# Patient Record
Sex: MALE | Race: BLACK OR AFRICAN AMERICAN | NOT HISPANIC OR LATINO | Employment: UNEMPLOYED | URBAN - METROPOLITAN AREA
[De-identification: names, ages, dates, MRNs, and addresses within clinical notes are randomized per-mention and may not be internally consistent; named-entity substitution may affect disease eponyms.]

---

## 2023-04-03 DIAGNOSIS — E78.9 LIPID DISORDER: Primary | ICD-10-CM

## 2023-04-03 RX ORDER — ATORVASTATIN CALCIUM 80 MG/1
80 TABLET, FILM COATED ORAL DAILY
Qty: 90 TABLET | Refills: 1 | Status: SHIPPED | OUTPATIENT
Start: 2023-04-03 | End: 2023-10-02 | Stop reason: SDUPTHER

## 2023-04-03 RX ORDER — ATORVASTATIN CALCIUM 80 MG/1
1 TABLET, FILM COATED ORAL DAILY
COMMUNITY
Start: 2020-03-25 | End: 2023-04-03 | Stop reason: SDUPTHER

## 2023-04-10 ENCOUNTER — HOSPITAL ENCOUNTER (OUTPATIENT)
Dept: DATA CONVERSION | Facility: HOSPITAL | Age: 68
End: 2023-04-10
Attending: INTERNAL MEDICINE | Admitting: INTERNAL MEDICINE
Payer: COMMERCIAL

## 2023-04-10 DIAGNOSIS — F17.210 NICOTINE DEPENDENCE, CIGARETTES, UNCOMPLICATED: ICD-10-CM

## 2023-04-10 DIAGNOSIS — I10 ESSENTIAL (PRIMARY) HYPERTENSION: ICD-10-CM

## 2023-04-10 DIAGNOSIS — K20.90 ESOPHAGITIS, UNSPECIFIED WITHOUT BLEEDING: ICD-10-CM

## 2023-04-10 DIAGNOSIS — K44.9 DIAPHRAGMATIC HERNIA WITHOUT OBSTRUCTION OR GANGRENE: ICD-10-CM

## 2023-04-10 DIAGNOSIS — R07.9 CHEST PAIN, UNSPECIFIED: ICD-10-CM

## 2023-04-10 DIAGNOSIS — R12 HEARTBURN: ICD-10-CM

## 2023-04-10 DIAGNOSIS — Z79.82 LONG TERM (CURRENT) USE OF ASPIRIN: ICD-10-CM

## 2023-04-10 DIAGNOSIS — K31.89 OTHER DISEASES OF STOMACH AND DUODENUM: ICD-10-CM

## 2023-04-19 LAB
COMPLETE PATHOLOGY REPORT: NORMAL
CONVERTED CLINICAL DIAGNOSIS-HISTORY: NORMAL
CONVERTED FINAL DIAGNOSIS: NORMAL
CONVERTED FINAL REPORT PDF LINK TO COPY AND PASTE: NORMAL
CONVERTED GROSS DESCRIPTION: NORMAL

## 2023-05-03 DIAGNOSIS — I10 HYPERTENSION, UNSPECIFIED TYPE: Primary | ICD-10-CM

## 2023-05-03 RX ORDER — AMLODIPINE BESYLATE 10 MG/1
10 TABLET ORAL DAILY
Qty: 90 TABLET | Refills: 1 | Status: SHIPPED | OUTPATIENT
Start: 2023-05-03 | End: 2023-07-25 | Stop reason: WASHOUT

## 2023-05-03 RX ORDER — AMLODIPINE BESYLATE 10 MG/1
1 TABLET ORAL DAILY
COMMUNITY
Start: 2019-04-23 | End: 2023-05-03 | Stop reason: SDUPTHER

## 2023-05-08 DIAGNOSIS — I25.118 CORONARY ARTERY DISEASE INVOLVING NATIVE HEART WITH OTHER FORM OF ANGINA PECTORIS, UNSPECIFIED VESSEL OR LESION TYPE (CMS-HCC): Primary | ICD-10-CM

## 2023-05-08 RX ORDER — CLOPIDOGREL BISULFATE 75 MG/1
75 TABLET ORAL DAILY
COMMUNITY
End: 2023-05-08 | Stop reason: SDUPTHER

## 2023-05-08 RX ORDER — CLOPIDOGREL BISULFATE 75 MG/1
75 TABLET ORAL DAILY
Qty: 90 TABLET | Refills: 1 | Status: SHIPPED | OUTPATIENT
Start: 2023-05-08 | End: 2023-12-15 | Stop reason: SDUPTHER

## 2023-06-15 ENCOUNTER — OFFICE VISIT (OUTPATIENT)
Dept: PRIMARY CARE | Facility: CLINIC | Age: 68
End: 2023-06-15
Payer: COMMERCIAL

## 2023-06-15 VITALS
DIASTOLIC BLOOD PRESSURE: 68 MMHG | BODY MASS INDEX: 20.7 KG/M2 | HEIGHT: 76 IN | WEIGHT: 170 LBS | TEMPERATURE: 97.6 F | SYSTOLIC BLOOD PRESSURE: 112 MMHG | HEART RATE: 57 BPM

## 2023-06-15 DIAGNOSIS — I25.10 CORONARY ARTERY DISEASE DUE TO LIPID RICH PLAQUE: ICD-10-CM

## 2023-06-15 DIAGNOSIS — N18.32 CHRONIC RENAL FAILURE (CRF), STAGE 3B (MULTI): ICD-10-CM

## 2023-06-15 DIAGNOSIS — M80.08XA AGE-RELATED OSTEOPOROSIS WITH CURRENT PATHOLOGICAL FRACTURE, VERTEBRA(E), INITIAL ENCOUNTER FOR FRACTURE (MULTI): ICD-10-CM

## 2023-06-15 DIAGNOSIS — R55 SYNCOPE AND COLLAPSE: ICD-10-CM

## 2023-06-15 DIAGNOSIS — I73.9 PAD (PERIPHERAL ARTERY DISEASE) (CMS-HCC): ICD-10-CM

## 2023-06-15 DIAGNOSIS — E55.9 VITAMIN D DEFICIENCY: ICD-10-CM

## 2023-06-15 DIAGNOSIS — M85.80 OSTEOPENIA, UNSPECIFIED LOCATION: ICD-10-CM

## 2023-06-15 DIAGNOSIS — Z00.00 HEALTHCARE MAINTENANCE: ICD-10-CM

## 2023-06-15 DIAGNOSIS — D64.9 ANEMIA, UNSPECIFIED TYPE: ICD-10-CM

## 2023-06-15 DIAGNOSIS — I25.83 CORONARY ARTERY DISEASE DUE TO LIPID RICH PLAQUE: ICD-10-CM

## 2023-06-15 DIAGNOSIS — R91.1 PULMONARY NODULE: ICD-10-CM

## 2023-06-15 DIAGNOSIS — K20.90 ESOPHAGITIS: Primary | ICD-10-CM

## 2023-06-15 LAB
ALANINE AMINOTRANSFERASE (SGPT) (U/L) IN SER/PLAS: 9 U/L (ref 10–52)
ALBUMIN (G/DL) IN SER/PLAS: 4.2 G/DL (ref 3.4–5)
ALKALINE PHOSPHATASE (U/L) IN SER/PLAS: 81 U/L (ref 33–136)
ANION GAP IN SER/PLAS: 13 MMOL/L (ref 10–20)
ASPARTATE AMINOTRANSFERASE (SGOT) (U/L) IN SER/PLAS: 17 U/L (ref 9–39)
BASOPHILS (10*3/UL) IN BLOOD BY AUTOMATED COUNT: 0.02 X10E9/L (ref 0–0.1)
BASOPHILS/100 LEUKOCYTES IN BLOOD BY AUTOMATED COUNT: 0.4 % (ref 0–2)
BILIRUBIN TOTAL (MG/DL) IN SER/PLAS: 0.7 MG/DL (ref 0–1.2)
CALCIDIOL (25 OH VITAMIN D3) (NG/ML) IN SER/PLAS: 38 NG/ML
CALCIUM (MG/DL) IN SER/PLAS: 10 MG/DL (ref 8.6–10.6)
CARBON DIOXIDE, TOTAL (MMOL/L) IN SER/PLAS: 24 MMOL/L (ref 21–32)
CHLORIDE (MMOL/L) IN SER/PLAS: 109 MMOL/L (ref 98–107)
CHOLESTEROL (MG/DL) IN SER/PLAS: 121 MG/DL (ref 0–199)
CHOLESTEROL IN HDL (MG/DL) IN SER/PLAS: 48.4 MG/DL
CHOLESTEROL/HDL RATIO: 2.5
CREATININE (MG/DL) IN SER/PLAS: 2.03 MG/DL (ref 0.5–1.3)
EOSINOPHILS (10*3/UL) IN BLOOD BY AUTOMATED COUNT: 0.21 X10E9/L (ref 0–0.7)
EOSINOPHILS/100 LEUKOCYTES IN BLOOD BY AUTOMATED COUNT: 4.6 % (ref 0–6)
ERYTHROCYTE DISTRIBUTION WIDTH (RATIO) BY AUTOMATED COUNT: 12.3 % (ref 11.5–14.5)
ERYTHROCYTE MEAN CORPUSCULAR HEMOGLOBIN CONCENTRATION (G/DL) BY AUTOMATED: 34.2 G/DL (ref 32–36)
ERYTHROCYTE MEAN CORPUSCULAR VOLUME (FL) BY AUTOMATED COUNT: 102 FL (ref 80–100)
ERYTHROCYTES (10*6/UL) IN BLOOD BY AUTOMATED COUNT: 4.13 X10E12/L (ref 4.5–5.9)
GFR MALE: 35 ML/MIN/1.73M2
GLUCOSE (MG/DL) IN SER/PLAS: 62 MG/DL (ref 74–99)
HEMATOCRIT (%) IN BLOOD BY AUTOMATED COUNT: 42.1 % (ref 41–52)
HEMOGLOBIN (G/DL) IN BLOOD: 14.4 G/DL (ref 13.5–17.5)
IMMATURE GRANULOCYTES/100 LEUKOCYTES IN BLOOD BY AUTOMATED COUNT: 0.2 % (ref 0–0.9)
LDL: 42 MG/DL (ref 0–99)
LEUKOCYTES (10*3/UL) IN BLOOD BY AUTOMATED COUNT: 4.6 X10E9/L (ref 4.4–11.3)
LYMPHOCYTES (10*3/UL) IN BLOOD BY AUTOMATED COUNT: 1.83 X10E9/L (ref 1.2–4.8)
LYMPHOCYTES/100 LEUKOCYTES IN BLOOD BY AUTOMATED COUNT: 40.2 % (ref 13–44)
MONOCYTES (10*3/UL) IN BLOOD BY AUTOMATED COUNT: 0.42 X10E9/L (ref 0.1–1)
MONOCYTES/100 LEUKOCYTES IN BLOOD BY AUTOMATED COUNT: 9.2 % (ref 2–10)
NEUTROPHILS (10*3/UL) IN BLOOD BY AUTOMATED COUNT: 2.06 X10E9/L (ref 1.2–7.7)
NEUTROPHILS/100 LEUKOCYTES IN BLOOD BY AUTOMATED COUNT: 45.4 % (ref 40–80)
NRBC (PER 100 WBCS) BY AUTOMATED COUNT: 0 /100 WBC (ref 0–0)
PLATELETS (10*3/UL) IN BLOOD AUTOMATED COUNT: 324 X10E9/L (ref 150–450)
POTASSIUM (MMOL/L) IN SER/PLAS: 5.2 MMOL/L (ref 3.5–5.3)
PROTEIN TOTAL: 6.6 G/DL (ref 6.4–8.2)
SODIUM (MMOL/L) IN SER/PLAS: 141 MMOL/L (ref 136–145)
THYROTROPIN (MIU/L) IN SER/PLAS BY DETECTION LIMIT <= 0.05 MIU/L: 1.38 MIU/L (ref 0.44–3.98)
TRIGLYCERIDE (MG/DL) IN SER/PLAS: 154 MG/DL (ref 0–149)
UREA NITROGEN (MG/DL) IN SER/PLAS: 22 MG/DL (ref 6–23)
VLDL: 31 MG/DL (ref 0–40)

## 2023-06-15 PROCEDURE — 80053 COMPREHEN METABOLIC PANEL: CPT

## 2023-06-15 PROCEDURE — 82306 VITAMIN D 25 HYDROXY: CPT

## 2023-06-15 PROCEDURE — G0438 PPPS, INITIAL VISIT: HCPCS | Performed by: FAMILY MEDICINE

## 2023-06-15 PROCEDURE — 1159F MED LIST DOCD IN RCRD: CPT | Performed by: FAMILY MEDICINE

## 2023-06-15 PROCEDURE — 84443 ASSAY THYROID STIM HORMONE: CPT

## 2023-06-15 PROCEDURE — 80061 LIPID PANEL: CPT

## 2023-06-15 PROCEDURE — 85025 COMPLETE CBC W/AUTO DIFF WBC: CPT

## 2023-06-15 RX ORDER — METOPROLOL SUCCINATE 25 MG/1
25 TABLET, EXTENDED RELEASE ORAL DAILY
COMMUNITY
End: 2023-12-15 | Stop reason: SDUPTHER

## 2023-06-15 RX ORDER — CHOLECALCIFEROL (VITAMIN D3) 25 MCG
1 TABLET ORAL DAILY
COMMUNITY

## 2023-06-15 RX ORDER — EPINEPHRINE 0.3 MG/.3ML
0.3 INJECTION SUBCUTANEOUS
COMMUNITY
Start: 2018-08-14 | End: 2024-06-05 | Stop reason: SDUPTHER

## 2023-06-15 RX ORDER — PNEUMOCOCCAL VACCINE POLYVALENT 25; 25; 25; 25; 25; 25; 25; 25; 25; 25; 25; 25; 25; 25; 25; 25; 25; 25; 25; 25; 25; 25; 25 UG/.5ML; UG/.5ML; UG/.5ML; UG/.5ML; UG/.5ML; UG/.5ML; UG/.5ML; UG/.5ML; UG/.5ML; UG/.5ML; UG/.5ML; UG/.5ML; UG/.5ML; UG/.5ML; UG/.5ML; UG/.5ML; UG/.5ML; UG/.5ML; UG/.5ML; UG/.5ML; UG/.5ML; UG/.5ML; UG/.5ML
0.5 INJECTION, SOLUTION INTRAMUSCULAR; SUBCUTANEOUS ONCE
Qty: 0.5 ML | Refills: 0 | Status: SHIPPED | OUTPATIENT
Start: 2023-06-15 | End: 2023-06-15

## 2023-06-15 RX ORDER — LOSARTAN POTASSIUM 100 MG/1
50 TABLET ORAL DAILY
COMMUNITY
End: 2023-07-25 | Stop reason: WASHOUT

## 2023-06-15 RX ORDER — ASPIRIN 81 MG/1
1 TABLET ORAL DAILY
COMMUNITY
Start: 2020-03-25

## 2023-06-15 RX ORDER — ESOMEPRAZOLE MAGNESIUM 40 MG/1
40 CAPSULE, DELAYED RELEASE ORAL
Qty: 30 CAPSULE | Refills: 11 | Status: SHIPPED | OUTPATIENT
Start: 2023-06-15 | End: 2023-07-25 | Stop reason: WASHOUT

## 2023-06-15 ASSESSMENT — ENCOUNTER SYMPTOMS
OCCASIONAL FEELINGS OF UNSTEADINESS: 0
DEPRESSION: 0
RESPIRATORY NEGATIVE: 1
LOSS OF SENSATION IN FEET: 0

## 2023-06-15 ASSESSMENT — PATIENT HEALTH QUESTIONNAIRE - PHQ9
SUM OF ALL RESPONSES TO PHQ9 QUESTIONS 1 AND 2: 0
1. LITTLE INTEREST OR PLEASURE IN DOING THINGS: NOT AT ALL
2. FEELING DOWN, DEPRESSED OR HOPELESS: NOT AT ALL

## 2023-06-15 NOTE — PROGRESS NOTES
Subjective   Reason for Visit: Kamron Grimm is an 67 y.o. male here for a Medicare Wellness visit.               HPI    Patient Care Team:  Linda Harry MD as PCP - General  Linda Harry MD as PCP - [employer] PCP     Review of Systems    Objective   Vitals:  There were no vitals taken for this visit.      Physical Exam    Assessment/Plan   Problem List Items Addressed This Visit    None          No

## 2023-06-15 NOTE — PROGRESS NOTES
"Subjective   Patient ID: Kamron Grimm is a 67 y.o. male who presents for [facility] .    HPI     Review of Systems   Respiratory: Negative.         Objective   /68   Pulse 57   Temp 36.4 °C (97.6 °F)   Ht 1.93 m (6' 4\")   Wt 77.1 kg (170 lb)   BMI 20.69 kg/m²     Physical Exam  HENT:      Head: Normocephalic.   Cardiovascular:      Rate and Rhythm: Normal rate.   Pulmonary:      Effort: Pulmonary effort is normal.   Abdominal:      General: Abdomen is flat.   Musculoskeletal:         General: Normal range of motion.   Skin:     General: Skin is warm.   Neurological:      General: No focal deficit present.      Mental Status: He is alert.   Psychiatric:         Mood and Affect: Mood normal.         Assessment/Plan     I saw and evaluated the patient. I personally obtained the key and critical portions of the history and physical exam. I reviewed the student 's documentation and discussed the patient with the student. I agree with the student medical decision making as documented in the student's note    This is a 67-year-old male patient who is here for his annual Medicare well exam and follow-up    I reviewed his chart and I was concerned about his prostate biopsy from 2019 advised him to see the urologist regarding that    Also he has nocturia and he might have to be on medication either to reduce the prostate volume or dilate the urethra but I would leave it up to the urologist to decide on that    Continue with all his other medication as it is    Regarding the chest pain it might be due to esophagitis that was diagnosed on the EGD advised him to give up smoking but apparently he is not ready to do that and also I informed him that the prieto that he drinks also irritates his esophagus so that might also be a issue for him to think about reducing the alcohol intake    Referred him to get the shingles vaccine and pneumonia vaccine    Also repeat the CAT scan for this year and I also ordered a bone " density to rule out osteopenia or osteoporosis

## 2023-06-15 NOTE — PROGRESS NOTES
"Pt is a 68 yo male with Hx of CAD, NSTEMI, HTN, hyperlipidemia, and benign prostatic hyperplasia. reporting for his annual Medicare wellvisit. Pt is reporting chest pains.    Pt has been having chest pains (6/10 pain) for less than a year. Has never had these before. Feels like \"a shot of indigestion.\" Takes TUMS and it \"quiets it down.\" Pain is not just after eating, will come up occasionally. Pain is described as dull and right in center of chest. Chest pain is not caused by exertion. Dyspnea on occasions such as walking up 30 stairs. Denies palpitations, dizziness, headaches, syncope. Denies chest pain while lying down or interruption of sleep. 2 pillows at night. Has had week of sneezing and coughing 1-2 weeks ago but better now.     Sleeps pretty good at night (6-8 hours). Wakes up 3-4 times at night to go to restroom. Urinating starts and stops.     Diet is mainly seafood, broccoli and green beans.     Pt's BP monitor is broken. He is also wondering if there is expiration on epi pen.    Tympanic membranes are intact. Minor sebaceous keratosis in right ear. Lungs clear to auscultation. Minor bradycardia (57), regular rhythm, normal S1 S2. No carotid bruits noted. Abdomen soft and non-tender to palpation. Normal bowel sounds.     Pt's symptoms are compatible with GERD, as his last endoscopy in April 2023 revealed LA Grade A esophagitis and a 3-cm hiatal hernia. His CAD, hyperlipidemia, and PMH of NSTEMI are concerning for angina, but pt's symptoms appear to be non-specific chest discomfort, and he has had regular visits to his cardiologist.     We will prescribe a PPI for his GERD. We advised pt to follow up with his urologist regarding his prostate. We will draw usual labs and bone density scan and order pneumococcal and shingrix vaccines. Follow-up in 6 months.  "

## 2023-06-15 NOTE — PATIENT INSTRUCTIONS
Kamron    I reviewed your chart --- it is important for you to see Dr.Donald Almazan regarding your prostate    I have referred you to smoking cessation class it is very important for you to learn to give up smoking    I will also place an order for you to take your pneumonia vaccine vaccine for shingles    I will also place an order to check your bones for thinning of the bones    I will check the blood vessels in your neck for narrowing I will order ultrasound of carotid arteries on your neck    You need to see the Eye Doc--- Dr. Lassiter --- last visit was in August    Will Start you on Nexium to reduce the acid causing chest pain--- It is aggravated by your CTN to smoke. Stopping smoking is the #1 thing you can do to prevent the chest pain from getting worse and allowing your food pipe to heal from the irritation caused by tobacco smoke going down your throat.    Maricel can also cause your chest pain because it irritates the food pipe from your throat to your stomach, and the pain can come up to 24 hours later, so try to hold off on it.    I hope you are eating healthy at least 2 green leafy vegetables daily and also try to get some exercise daily such as walking and muscle building exercises around --- shoulder/ core / hip/ knee

## 2023-07-25 ENCOUNTER — OFFICE VISIT (OUTPATIENT)
Dept: PRIMARY CARE | Facility: CLINIC | Age: 68
End: 2023-07-25
Payer: COMMERCIAL

## 2023-07-25 ENCOUNTER — PATIENT OUTREACH (OUTPATIENT)
Dept: PRIMARY CARE | Facility: CLINIC | Age: 68
End: 2023-07-25

## 2023-07-25 VITALS
BODY MASS INDEX: 20.46 KG/M2 | TEMPERATURE: 97.6 F | HEIGHT: 76 IN | DIASTOLIC BLOOD PRESSURE: 58 MMHG | HEART RATE: 60 BPM | WEIGHT: 168 LBS | SYSTOLIC BLOOD PRESSURE: 109 MMHG

## 2023-07-25 DIAGNOSIS — R55 SYNCOPE AND COLLAPSE: ICD-10-CM

## 2023-07-25 DIAGNOSIS — G47.30 SLEEP APNEA IN ADULT: Primary | ICD-10-CM

## 2023-07-25 DIAGNOSIS — I10 HYPERTENSION, UNSPECIFIED TYPE: ICD-10-CM

## 2023-07-25 DIAGNOSIS — I25.10 CORONARY ARTERY DISEASE DUE TO LIPID RICH PLAQUE: ICD-10-CM

## 2023-07-25 DIAGNOSIS — N18.32 CHRONIC RENAL FAILURE (CRF), STAGE 3B (MULTI): ICD-10-CM

## 2023-07-25 DIAGNOSIS — I25.83 CORONARY ARTERY DISEASE DUE TO LIPID RICH PLAQUE: ICD-10-CM

## 2023-07-25 PROCEDURE — 1159F MED LIST DOCD IN RCRD: CPT | Performed by: FAMILY MEDICINE

## 2023-07-25 PROCEDURE — 99215 OFFICE O/P EST HI 40 MIN: CPT | Performed by: FAMILY MEDICINE

## 2023-07-25 PROCEDURE — 1125F AMNT PAIN NOTED PAIN PRSNT: CPT | Performed by: FAMILY MEDICINE

## 2023-07-25 PROCEDURE — 3074F SYST BP LT 130 MM HG: CPT | Performed by: FAMILY MEDICINE

## 2023-07-25 PROCEDURE — 3078F DIAST BP <80 MM HG: CPT | Performed by: FAMILY MEDICINE

## 2023-07-25 RX ORDER — AMLODIPINE BESYLATE 5 MG/1
5 TABLET ORAL DAILY
Qty: 90 TABLET | Refills: 1 | Status: SHIPPED | OUTPATIENT
Start: 2023-07-25 | End: 2024-07-24

## 2023-07-25 RX ORDER — TAMSULOSIN HYDROCHLORIDE 0.4 MG/1
0.4 CAPSULE ORAL
COMMUNITY
End: 2024-03-04 | Stop reason: WASHOUT

## 2023-07-25 RX ORDER — ISOSORBIDE DINITRATE 30 MG/1
60 TABLET ORAL DAILY
COMMUNITY
End: 2023-12-04 | Stop reason: WASHOUT

## 2023-07-25 RX ORDER — SPIRONOLACTONE 25 MG/1
25 TABLET ORAL DAILY
COMMUNITY
End: 2024-03-04 | Stop reason: SDUPTHER

## 2023-07-25 RX ORDER — LOSARTAN POTASSIUM 50 MG/1
50 TABLET ORAL DAILY
Qty: 90 TABLET | Refills: 1 | Status: SHIPPED | OUTPATIENT
Start: 2023-07-25 | End: 2024-03-04 | Stop reason: WASHOUT

## 2023-07-25 RX ORDER — AMLODIPINE BESYLATE 5 MG/1
5 TABLET ORAL DAILY
Qty: 30 TABLET | Refills: 11 | Status: SHIPPED | OUTPATIENT
Start: 2023-07-25 | End: 2023-07-25 | Stop reason: SDUPTHER

## 2023-07-25 RX ORDER — LOSARTAN POTASSIUM 50 MG/1
50 TABLET ORAL DAILY
Qty: 30 TABLET | Refills: 11 | Status: SHIPPED | OUTPATIENT
Start: 2023-07-25 | End: 2023-07-25 | Stop reason: SDUPTHER

## 2023-07-25 NOTE — PROGRESS NOTES
This is a hospital follow-up    He has many medical problems while he was getting his haircut he lost consciousness and was rushed to the emergency room    Reviewing his labs I noticed that he had a high alcohol level and also his blood pressure is extremely low and his pulse is also low so my diagnosis is it could be that his low blood pressure and low pulse would have made him have a syncopal attack    Reviewing his sleep apnea test he has severe sleep apnea I am wondering whether that can be contributing for him to have a syncopal attack    He has not got his CPAP machine I referred him to the sleep specialist to talk about this further    I advised him to see the cardiologist since he had the syncopal attack and also his blood pressure is low    I reduce the losartan to 50 mg and amlodipine to 5 continue the metoprolol at 25    He also has gastritis on EGD and continuing to drink alcohol I advised him to take Tums and also advised him to stop drinking alcohol and also he is continuing to smoke      Advised him to come back in 1 month

## 2023-07-25 NOTE — PATIENT INSTRUCTIONS
Amlodipine is changed from 10 mg per day to 5 mg per day  2. Losartan is changed from 100 mg per day to 50 mg day

## 2023-07-25 NOTE — PROGRESS NOTES
Discharge Facility: [place] [facility] [address]  Discharge Diagnosis: syncope/hypotension  Admission Date: 7-  Discharge Date: 7-    PCP Appointment Date: 7-  Specialist Appointment Date: FERMIN  Hospital Encounter and Summary: Linked   See discharge assessment below for further details  Engagement  Call Start Time: 0920 (7/25/2023  9:29 AM)    Medications  Medications reviewed with patient/caregiver?: Yes (7/25/2023  9:29 AM)  Is the patient having any side effects they believe may be caused by any medication additions or changes?: No (7/25/2023  9:29 AM)  Does the patient have all medications ordered at discharge?: Yes (7/25/2023  9:29 AM)  Care Management Interventions: Provided patient education (7/25/2023  9:29 AM)  Prescription Comments: Will decrease his losartan to 50 mg daily, amlodipine 5 mg daily and metoprolol 12.5 mg daily.  Patient states his BP monitor has not been functioning.Discussed BP monitors Omron and where to obtain  He will monitor his bp and hr at home and call his pcp with changes Discussed importancee of daily monitoring at different times of the day and recording results for review at follow up appointments. (7/25/2023  9:29 AM)  Is the patient taking all medications as directed (includes completed medication regime)?: Yes (7/25/2023  9:29 AM)  Care Management Interventions: Notified pharmacy for assistance (7/25/2023  9:29 AM)  Medication Comments: Outreach to pharmacy to support BP monitoring.No issues reported in regards to accessibility affordability adherence  Denies any  questions or concerns about their medications once they are home. Verbalizes an understanding regarding how to take their medications and which medications they should be taking.  Denies the need for any medication refills at this time. (7/25/2023  9:29 AM)    Appointments  Does the patient have a primary care provider?: Yes (Confirmed PCP follow up today) (7/25/2023  9:29 AM)  Care Management Interventions: Verified  appointment date/time/provider (2023  9:29 AM)  Has the patient kept scheduled appointments due by today?: Yes (2023  9:29 AM)  Care Management Interventions: Advised patient to keep appointment (2023  9:29 AM)    Self Management  What is the home health agency?: NA (2023  9:29 AM)  Has home health visited the patient within 72 hours of discharge?: Not applicable (2023  9:29 AM)  What Durable Medical Equipment (DME) was ordered?: NA (2023  9:29 AM)  Has all Durable Medical Equipment (DME) been delivered?: No (2023  9:29 AM)    Patient Teaching  Does the patient have access to their discharge instructions?: No (2023  9:29 AM)  Care Management Interventions: Reviewed instructions with patient (2023  9:29 AM)  What is the patient's perception of their health status since discharge?: Improving (2023  9:29 AM)  Is the patient/caregiver able to teach back the hierarchy of who to call/visit for symptoms/problems? PCP, Specialist, Home Health nurse, Urgent Care, ED, 911: Yes (You need to call your doctor,  return to the closest ED if you develop any new  or worsening symptoms:  concerning symptoms  call 911 for emergency situations  Call your doctor for questions / concerrns.) (2023  9:29 AM)  Patient/Caregiver Education Comments: Patient denies any questions related to the discharge instructions.Emphasized the importance of hospital follow up. This is a great way for you to connect with your provider to ensure you have safely transitioned home.If you have any questions or concerns prior to appointment, please call the PCP's office right away. (2023  9:29 AM)    Wrap Up  Wrap Up Additional Comments: Spoke w/ pt  Pt identified by name and  Reviewed discharge instructions, medications, and follow up.  Patient denies any further discharge questions/needs at this time.  Confirms they will attend the scheduled follow up appointment scheduled for today. (2023   9:29 AM)  Call End Time: 0935 (7/25/2023  9:29 AM)

## 2023-07-25 NOTE — PROGRESS NOTES
Mr. Grimm a 68 byear old man with Pmh alcohol use, GERD, ANUM, heart failure, here for followup  following LOC and ED visit.     Echo with elevated RVP increase and sleep apnea    Prostate - Abnormal cells. Sees a urologist now for his prostate.    EGD -     ANUM and CPAP counseled. Willing to try it.    Counseled regarding seeing a cardiologist

## 2023-07-26 ENCOUNTER — PATIENT OUTREACH (OUTPATIENT)
Dept: PRIMARY CARE | Facility: CLINIC | Age: 68
End: 2023-07-26
Payer: COMMERCIAL

## 2023-07-26 NOTE — PROGRESS NOTES
Call regarding appt. with PCP on  7- 25 -2023 after hospitalization.  At time of outreach call the patient feels as if their condition has improved.  They  deny any questions or concerns regarding  the recovery period  or follow up appointment,  Agreeable to continued outreach. They have my direct phone # and were encouraged to please reach out should they need any assistance prior to my next outreach.

## 2023-07-28 ENCOUNTER — PATIENT OUTREACH (OUTPATIENT)
Dept: PHARMACY | Facility: HOSPITAL | Age: 68
End: 2023-07-28
Payer: COMMERCIAL

## 2023-07-28 RX ORDER — DIPHENHYDRAMINE HCL 25 MG
4 CAPSULE ORAL AS NEEDED
COMMUNITY

## 2023-07-28 NOTE — PROGRESS NOTES
"Transitional Care Management: Pharmacy    Subjective   Kamron Grimm is a 68 y.o. male who was referred to Clinical Pharmacy Team to complete TCM medication reconciliation prior to office visit with Linda Harry MD. A comprehensive medication review was completed with the patient. patient did not have all medications and/or an updated medication list in front of them during the telephone encounter.     Admission Date:7/22/23  Discharge Date:7/23/23    Notable medication changes following discharge:  START: N/A  STOP: N/A  CHANGE:   - Amlodipine decreased to 5 mg   - losartan decreased to 50 mg   - Metoprolol decreased to 12.5 mg   - Spironolactone decreased to 25 mg daily  - Tamsulosin decreased to 0.4 mg once at bedtime    MEDICATION RECONCILIATION  Added: Nicotine gum 4 mg   Changed: N/A   Removed: N/A    Allergies   Allergen Reactions    Bee Venom Protein (Honey Bee) Swelling    Lisinopril Cough and Unknown    Prednisolone Unknown       [facility] [address] [place] [address] & [address] [place] [id number]  Phone: [phone] Fax: [phone]       No issues reported in regards to accessibility adherence adverse effects organization.    HPI    Objective     LAB  There were no vitals taken for this visit.     Lab Results   Component Value Date    BILITOT 0.9 07/23/2023    CALCIUM 8.4 (L) 07/23/2023    CO2 20 (L) 07/23/2023     07/23/2023    CREATININE 2.15 (H) 07/23/2023    GLUCOSE 79 07/23/2023    ALKPHOS 59 07/23/2023    K 4.2 07/23/2023    PROT 5.9 (L) 07/23/2023     (L) 07/23/2023    AST 12 07/23/2023    ALT 6 (L) 07/23/2023    BUN 25 (H) 07/23/2023    ANIONGAP 11 07/23/2023    MG 1.80 07/22/2023    PHOS 3.5 01/11/2023    ALBUMIN 3.5 07/23/2023    GFRMALE 33 (A) 07/23/2023     Lab Results   Component Value Date    TRIG 154 (H) 06/15/2023    CHOL 121 06/15/2023    HDL 48.4 06/15/2023     No results found for: \"HGBA1C\"    DRUG " INTERACTIONS  -No major interactions    Assessment/Plan    Comments/Recommendations to PCP:  Patient was not able to obtain new blood pressure monitor due to cost since insurance will not pay for a new one yet.  Offered patient Omron monitor through  retail pharmacies for price of $34 but patient declined.      Continue all meds under the continuation of care with the referring provider and clinical pharmacy team.    Stefany Escalera, PharmD    Verbal consent to manage patient's drug therapy was obtained from the patient and/or an individual authorized to act on behalf of a patient. They were informed they may decline to participate or withdraw from participation in pharmacy services at any time.

## 2023-08-28 ENCOUNTER — OFFICE VISIT (OUTPATIENT)
Dept: PRIMARY CARE | Facility: CLINIC | Age: 68
End: 2023-08-28
Payer: COMMERCIAL

## 2023-08-28 VITALS
SYSTOLIC BLOOD PRESSURE: 121 MMHG | HEART RATE: 65 BPM | BODY MASS INDEX: 20.82 KG/M2 | TEMPERATURE: 97.2 F | HEIGHT: 76 IN | WEIGHT: 171 LBS | DIASTOLIC BLOOD PRESSURE: 72 MMHG

## 2023-08-28 DIAGNOSIS — I10 HYPERTENSION, UNSPECIFIED TYPE: Primary | ICD-10-CM

## 2023-08-28 DIAGNOSIS — I25.83 CORONARY ARTERY DISEASE DUE TO LIPID RICH PLAQUE: ICD-10-CM

## 2023-08-28 DIAGNOSIS — N18.32 CHRONIC RENAL FAILURE (CRF), STAGE 3B (MULTI): ICD-10-CM

## 2023-08-28 DIAGNOSIS — I73.9 PAD (PERIPHERAL ARTERY DISEASE) (CMS-HCC): ICD-10-CM

## 2023-08-28 DIAGNOSIS — I25.10 CORONARY ARTERY DISEASE DUE TO LIPID RICH PLAQUE: ICD-10-CM

## 2023-08-28 PROCEDURE — 99214 OFFICE O/P EST MOD 30 MIN: CPT | Performed by: FAMILY MEDICINE

## 2023-08-28 PROCEDURE — 1125F AMNT PAIN NOTED PAIN PRSNT: CPT | Performed by: FAMILY MEDICINE

## 2023-08-28 PROCEDURE — 1159F MED LIST DOCD IN RCRD: CPT | Performed by: FAMILY MEDICINE

## 2023-08-28 PROCEDURE — 3074F SYST BP LT 130 MM HG: CPT | Performed by: FAMILY MEDICINE

## 2023-08-28 PROCEDURE — 3078F DIAST BP <80 MM HG: CPT | Performed by: FAMILY MEDICINE

## 2023-08-28 NOTE — PROGRESS NOTES
This is a 68-year-old     Multiple medical problems    Very noncompliant    He missed his sleep specialist appointment but promised to get another appointment I hope he will do so; he totally understands the consequences of sleep apnea not being treated he has severe coronary artery disease with congestive heart failure and chronic renal failure      I read the notes of the urology and nephrologist     Reminded patient to call the urology office for the MRI prostate order  Until I told him about the MRI for the prostate he had no clue of that order    Also I read the nephrology notes the nephrologist wanted him to come back with a food journal to find out whether he is a good candidate to start him on Farxiga for renal protection      The reduce doses of amlodipine losartan and metoprolol seems to be holding the blood pressure good he has never had any syncopal episodes in since I saw him last    Again reminded patient to see the cardiologist    I also referred him to clinic pharmacy to get help with the blood pressure monitor    He was advised to bring all his medications to all his appointments    He was not sure about his isosorbide dose    I confirmed that he is taking amlodipine 5 mg losartan 50 and metoprolol 25    Advised him to come back in 3 months    Again I spoke to him about alcohol and smoking he says he will work at those

## 2023-08-28 NOTE — PATIENT INSTRUCTIONS
You saw the urology people on June 28 and they had ordered the MRI of your prostate which you never got it done please look into that.  You are asked to come in 6 weeks to go through the MRI results of the prostate.  Today you are telling me you never knew that there was a order for a prostate MRI therefore please call the urology office to find out about the order for the MRI of the prostate      The kidney doctor had advised about a medicine to protect your kidneys from further damage she wanted you to come back with a food journal to find out how much starch you are eating.  May be you need to make another follow-up appointment to see the kidney doctor sometime in the fall but before that you need to write down your food journal at least for 1 week so that she can see how much starch you are eating      Very important for you to see the sleep specialist and you missed your appointment that was set up for you in [place] but you are telling me that you can go and see the doctor in November which is closer to you but you still have not made arrangements so make sure you take the responsibility to make the arrangements to see the sleep specialist      As well as take the responsibility tales see the cardiologist      Again I remind you to think about smoking and intake of alcohol for your health    You need to be responsible you need to find out how you can improve your health and I am telling you over and over again reading other doctors notes and what you need to get done but apparently when questioning you have no clue    The good news is your blood pressure is good on the reduced medicine      Reviewing your medicines you were not sure whether you are taking isosorbide 30 mg or 60 mg make sure you bring all your medicines for all the appointments not only to see me but to see the other doctors also you need to take all your medicines in the original bottles    Amlodipine 5 mg losartan 50 and metoprolol  25--this your main blood pressure med      Flomax, spironolactone, isosorbide all of these 3 medicines also affect your blood pressure they reduce the blood pressure    Follow-up in 3 months

## 2023-09-07 VITALS — HEIGHT: 76 IN | WEIGHT: 175.27 LBS | BODY MASS INDEX: 21.34 KG/M2

## 2023-09-12 ENCOUNTER — PATIENT OUTREACH (OUTPATIENT)
Dept: PRIMARY CARE | Facility: CLINIC | Age: 68
End: 2023-09-12
Payer: COMMERCIAL

## 2023-09-12 LAB
ALBUMIN (G/DL) IN SER/PLAS: 4.3 G/DL (ref 3.4–5)
ALBUMIN (MG/L) IN URINE: <7 MG/L
ALBUMIN/CREATININE (UG/MG) IN URINE: NORMAL UG/MG CRT (ref 0–30)
ANION GAP IN SER/PLAS: 10 MMOL/L (ref 10–20)
APPEARANCE, URINE: CLEAR
BILIRUBIN, URINE: NEGATIVE
BLOOD, URINE: NEGATIVE
CALCIDIOL (25 OH VITAMIN D3) (NG/ML) IN SER/PLAS: 51 NG/ML
CALCIUM (MG/DL) IN SER/PLAS: 8.7 MG/DL (ref 8.6–10.3)
CARBON DIOXIDE, TOTAL (MMOL/L) IN SER/PLAS: 26 MMOL/L (ref 21–32)
CHLORIDE (MMOL/L) IN SER/PLAS: 106 MMOL/L (ref 98–107)
COLOR, URINE: YELLOW
CREATININE (MG/DL) IN SER/PLAS: 2 MG/DL (ref 0.5–1.3)
CREATININE (MG/DL) IN URINE: 118 MG/DL (ref 20–370)
CREATININE (MG/DL) IN URINE: 118 MG/DL (ref 20–370)
ERYTHROCYTE DISTRIBUTION WIDTH (RATIO) BY AUTOMATED COUNT: 13.5 % (ref 11.5–14.5)
ERYTHROCYTE MEAN CORPUSCULAR HEMOGLOBIN CONCENTRATION (G/DL) BY AUTOMATED: 34.6 G/DL (ref 32–36)
ERYTHROCYTE MEAN CORPUSCULAR VOLUME (FL) BY AUTOMATED COUNT: 100 FL (ref 80–100)
ERYTHROCYTES (10*6/UL) IN BLOOD BY AUTOMATED COUNT: 4 X10E12/L (ref 4.5–5.9)
GFR MALE: 36 ML/MIN/1.73M2
GLUCOSE (MG/DL) IN SER/PLAS: 75 MG/DL (ref 74–99)
GLUCOSE, URINE: NEGATIVE MG/DL
HEMATOCRIT (%) IN BLOOD BY AUTOMATED COUNT: 39.9 % (ref 41–52)
HEMOGLOBIN (G/DL) IN BLOOD: 13.8 G/DL (ref 13.5–17.5)
KETONES, URINE: NEGATIVE MG/DL
LEUKOCYTE ESTERASE, URINE: ABNORMAL
LEUKOCYTES (10*3/UL) IN BLOOD BY AUTOMATED COUNT: 5 X10E9/L (ref 4.4–11.3)
NITRITE, URINE: NEGATIVE
NRBC (PER 100 WBCS) BY AUTOMATED COUNT: 0.4 /100 WBC (ref 0–0)
PARATHYRIN INTACT (PG/ML) IN SER/PLAS: 51.4 PG/ML (ref 18.5–88)
PH, URINE: 6 (ref 5–8)
PHOSPHATE (MG/DL) IN SER/PLAS: 3.6 MG/DL (ref 2.5–4.9)
PLATELETS (10*3/UL) IN BLOOD AUTOMATED COUNT: 352 X10E9/L (ref 150–450)
POTASSIUM (MMOL/L) IN SER/PLAS: 4.3 MMOL/L (ref 3.5–5.3)
PROTEIN (MG/DL) IN URINE: 12 MG/DL (ref 5–25)
PROTEIN, URINE: NEGATIVE MG/DL
PROTEIN/CREATININE (MG/MG) IN URINE: 0.1 MG/MG CREAT (ref 0–0.17)
RBC, URINE: 2 /HPF (ref 0–5)
SODIUM (MMOL/L) IN SER/PLAS: 138 MMOL/L (ref 136–145)
SPECIFIC GRAVITY, URINE: 1.01 (ref 1–1.03)
SQUAMOUS EPITHELIAL CELLS, URINE: 1 /HPF
UREA NITROGEN (MG/DL) IN SER/PLAS: 16 MG/DL (ref 6–23)
UROBILINOGEN, URINE: <2 MG/DL (ref 0–1.9)
WBC, URINE: 17 /HPF (ref 0–5)

## 2023-09-12 NOTE — PROGRESS NOTES
Call placed regarding one month post discharge follow up call.  At time of outreach call the patient feels as if their condition has improved.  They  deny any questions or concerns regarding  the recovery period  or follow up appointment, He states he has been busy with follow up appointments.  He reports that he missed his cardiology follow up - I emphasized the importance of rescheduling.  He is agreeable

## 2023-09-27 LAB
APPEARANCE, URINE: CLEAR
BILIRUBIN, URINE: NEGATIVE
BLOOD, URINE: NEGATIVE
COLOR, URINE: YELLOW
GLUCOSE, URINE: NEGATIVE MG/DL
KETONES, URINE: NEGATIVE MG/DL
LEUKOCYTE ESTERASE, URINE: NEGATIVE
NITRITE, URINE: NEGATIVE
PH, URINE: 7 (ref 5–8)
PROSTATE SPECIFIC AG (NG/ML) IN SER/PLAS: 6.23 NG/ML (ref 0–4)
PROTEIN, URINE: NEGATIVE MG/DL
SPECIFIC GRAVITY, URINE: 1.01 (ref 1–1.03)
UROBILINOGEN, URINE: <2 MG/DL (ref 0–1.9)

## 2023-09-28 LAB — URINE CULTURE: NORMAL

## 2023-10-02 DIAGNOSIS — E78.9 LIPID DISORDER: ICD-10-CM

## 2023-10-02 RX ORDER — ATORVASTATIN CALCIUM 80 MG/1
80 TABLET, FILM COATED ORAL DAILY
Qty: 90 TABLET | Refills: 1 | Status: SHIPPED | OUTPATIENT
Start: 2023-10-02 | End: 2024-04-29 | Stop reason: SDUPTHER

## 2023-10-26 ENCOUNTER — PATIENT OUTREACH (OUTPATIENT)
Dept: PRIMARY CARE | Facility: CLINIC | Age: 68
End: 2023-10-26
Payer: COMMERCIAL

## 2023-10-31 ENCOUNTER — HOSPITAL ENCOUNTER (OUTPATIENT)
Dept: RADIOLOGY | Facility: HOSPITAL | Age: 68
Discharge: HOME | End: 2023-10-31
Payer: COMMERCIAL

## 2023-10-31 DIAGNOSIS — R97.20 ELEVATED PROSTATE SPECIFIC ANTIGEN (PSA): ICD-10-CM

## 2023-10-31 PROCEDURE — 2550000001 HC RX 255 CONTRASTS: Performed by: NURSE PRACTITIONER

## 2023-10-31 PROCEDURE — A9575 INJ GADOTERATE MEGLUMI 0.1ML: HCPCS | Performed by: NURSE PRACTITIONER

## 2023-10-31 PROCEDURE — 72197 MRI PELVIS W/O & W/DYE: CPT | Performed by: RADIOLOGY

## 2023-10-31 PROCEDURE — 72197 MRI PELVIS W/O & W/DYE: CPT

## 2023-10-31 RX ORDER — GADOTERATE MEGLUMINE 376.9 MG/ML
16 INJECTION INTRAVENOUS
Status: COMPLETED | OUTPATIENT
Start: 2023-10-31 | End: 2023-10-31

## 2023-10-31 RX ADMIN — GADOTERATE MEGLUMINE 16 ML: 376.9 INJECTION INTRAVENOUS at 17:20

## 2023-11-28 ENCOUNTER — APPOINTMENT (OUTPATIENT)
Dept: PRIMARY CARE | Facility: CLINIC | Age: 68
End: 2023-11-28
Payer: COMMERCIAL

## 2023-11-28 NOTE — PROGRESS NOTES
Subjective   Patient ID: Kamron Grimm is a 68 y.o. male presenting for FUV to review labs and imaging     HPI  History of Elevated PSA. Family history of prostate cancer (maternal uncle)  Negative biopsy 7/18/19, negative MRI 8/12/21. CAD (NSTEMI 3/24/20 treated with cardiac rehab) on Brilinta   HTN. Chronic renal insufficiency followed by Dr. Walter     Today's visit; Patient has been doing well still having NTF x3-4. Occasional daytime frequency. Patient notes occasional slow flow. Denies hematuria, dysuria, flank pain, and bothersome frequency or urgency.   Patient is a current smoker.      Lab Results   Component Value Date    PSA 6.23 (H) 09/27/2023    PSA 8.27 (H) 02/15/2023    PSA 8.39 (H) 06/24/2021 09/27/2023 Urine Culture Negative  09/12/2023 Urinalysis, microscopic: RBC 2/HPF    I personally reviewed Prostate MRI 10/31/2023  1. There is no evidence of clinically significant neoplasm.  2. BPH changes of the transition zone. Diffuse non nodular  hypointensities within the peripheral zone, without evidence of  focally restricted diffusion (PI-RADS 2).      PI-RADS 2 - Low (clinically significant cancer is unlikely to be  present).    Review of Systems  All other systems have been reviewed and are negative for complaint.    Objective   Physical Exam  General: Well developed, well nourished, alert and cooperative, appears in no acute distress  Eyes: Non-injected conjunctiva, sclera clear, no proptosis  Cardiac: Extremities are warm and well perfused. No edema, cyanosis or pallor.   Lungs: Breathing is easy, non-labored. Speaking in clear and complete sentences. Normal diaphragmatic movement.  MSK: Ambulatory with steady gait, unassisted  Neuro: alert and oriented to person, place and time  Psych: Demonstrates good judgement and reason, without hallucinations, abnormal affect or abnormal behaviors.  Skin: no obvious lesions, no rashes.    Assessment/Plan   Diagnoses and all orders for this  visit:  Elevated PSA  BPH with obstruction/lower urinary tract symptoms      (Elevated PSA)  Today we discussed PSA as a tool to screen gentleman for prostate cancer. We discussed that many factors can elevate the PSA, and when the PSA is elevated further testing is warranted.     Prostate MRI 10/31/2023 demonstrated at 80.2 g prostate, PI-RADS 2 lesion with no evidence of clinically significant malignancy       Continue Tamsulosin to 0.8 mg daily    Patient will follow up in 6 months, sooner if needed.      All questions and concerns were addressed. Patient verbalizes understanding and has no other questions at this time. You are able to have email access to your chart. You can sign into [employer] or add the Follow My Health slivana on your smart phone to review today's visit, laboratory work and imaging.      Vicky Joyner-- KAREEN MARIE  Office Phone: [phone]

## 2023-11-29 ENCOUNTER — OFFICE VISIT (OUTPATIENT)
Dept: UROLOGY | Facility: HOSPITAL | Age: 68
End: 2023-11-29
Payer: COMMERCIAL

## 2023-11-29 DIAGNOSIS — N13.8 BPH WITH OBSTRUCTION/LOWER URINARY TRACT SYMPTOMS: Primary | ICD-10-CM

## 2023-11-29 DIAGNOSIS — R97.20 ELEVATED PSA: ICD-10-CM

## 2023-11-29 DIAGNOSIS — N40.1 BPH WITH OBSTRUCTION/LOWER URINARY TRACT SYMPTOMS: Primary | ICD-10-CM

## 2023-11-29 LAB
POC APPEARANCE, URINE: CLEAR
POC BILIRUBIN, URINE: NEGATIVE
POC BLOOD, URINE: ABNORMAL
POC COLOR, URINE: YELLOW
POC GLUCOSE, URINE: NEGATIVE MG/DL
POC KETONES, URINE: NEGATIVE MG/DL
POC LEUKOCYTES, URINE: NEGATIVE
POC NITRITE,URINE: NEGATIVE
POC PH, URINE: 7 PH
POC PROTEIN, URINE: NEGATIVE MG/DL
POC SPECIFIC GRAVITY, URINE: 1.02
POC UROBILINOGEN, URINE: 0.2 EU/DL

## 2023-11-29 PROCEDURE — 1126F AMNT PAIN NOTED NONE PRSNT: CPT | Performed by: NURSE PRACTITIONER

## 2023-11-29 PROCEDURE — 99213 OFFICE O/P EST LOW 20 MIN: CPT | Performed by: NURSE PRACTITIONER

## 2023-11-29 PROCEDURE — 1159F MED LIST DOCD IN RCRD: CPT | Performed by: NURSE PRACTITIONER

## 2023-12-04 ENCOUNTER — OFFICE VISIT (OUTPATIENT)
Dept: PRIMARY CARE | Facility: CLINIC | Age: 68
End: 2023-12-04
Payer: COMMERCIAL

## 2023-12-04 VITALS
HEART RATE: 59 BPM | BODY MASS INDEX: 21.07 KG/M2 | SYSTOLIC BLOOD PRESSURE: 139 MMHG | WEIGHT: 173 LBS | DIASTOLIC BLOOD PRESSURE: 81 MMHG | HEIGHT: 76 IN | TEMPERATURE: 98 F

## 2023-12-04 DIAGNOSIS — I25.10 CORONARY ARTERY DISEASE DUE TO LIPID RICH PLAQUE: ICD-10-CM

## 2023-12-04 DIAGNOSIS — I10 HYPERTENSION, UNSPECIFIED TYPE: Primary | ICD-10-CM

## 2023-12-04 DIAGNOSIS — I25.83 CORONARY ARTERY DISEASE DUE TO LIPID RICH PLAQUE: ICD-10-CM

## 2023-12-04 DIAGNOSIS — R55 SYNCOPE AND COLLAPSE: ICD-10-CM

## 2023-12-04 PROCEDURE — 1126F AMNT PAIN NOTED NONE PRSNT: CPT | Performed by: FAMILY MEDICINE

## 2023-12-04 PROCEDURE — 99214 OFFICE O/P EST MOD 30 MIN: CPT | Performed by: FAMILY MEDICINE

## 2023-12-04 PROCEDURE — 3075F SYST BP GE 130 - 139MM HG: CPT | Performed by: FAMILY MEDICINE

## 2023-12-04 PROCEDURE — 3079F DIAST BP 80-89 MM HG: CPT | Performed by: FAMILY MEDICINE

## 2023-12-04 PROCEDURE — 1159F MED LIST DOCD IN RCRD: CPT | Performed by: FAMILY MEDICINE

## 2023-12-04 RX ORDER — ISOSORBIDE MONONITRATE 60 MG/1
60 TABLET, EXTENDED RELEASE ORAL DAILY
COMMUNITY
End: 2024-02-27 | Stop reason: SDUPTHER

## 2023-12-04 NOTE — PROGRESS NOTES
This is a 68-year-old male patient here for follow-up    He says he was at a grocery store while waiting online for checkout he became diaphoretic and presyncopal.    No chest pain no shortness of breath no palpitations    Feeling lasted for about 30 minutes since he recovered from it he never went to the emergency room    On exam vital signs are stable unremarkable lung and heart exam    I messaged his cardiologist as well as I advised him if he gets the symptoms again for him to rush to the emergency room and also call the cardiology office to see whether he can see the cardiologist sometime this week    Advised him to come back in 3 months

## 2023-12-04 NOTE — PATIENT INSTRUCTIONS
On Friday, November 30 you had an episode of fainting with profuse sweating it can be a sign symptom of a minor heart attack I am send a message to your cardiologist right on the chart but I also would like you to call  for follow-up appointment if possible try to get in for you to see him this week      I like you to come back in 3 months    If this episode happens again please check yourself to the emergency room

## 2023-12-11 ENCOUNTER — APPOINTMENT (OUTPATIENT)
Dept: PRIMARY CARE | Facility: CLINIC | Age: 68
End: 2023-12-11
Payer: COMMERCIAL

## 2023-12-12 ENCOUNTER — APPOINTMENT (OUTPATIENT)
Dept: CARDIOLOGY | Facility: HOSPITAL | Age: 68
End: 2023-12-12
Payer: COMMERCIAL

## 2023-12-14 ENCOUNTER — OFFICE VISIT (OUTPATIENT)
Dept: CARDIOLOGY | Facility: HOSPITAL | Age: 68
End: 2023-12-14
Payer: COMMERCIAL

## 2023-12-14 VITALS
DIASTOLIC BLOOD PRESSURE: 71 MMHG | WEIGHT: 177 LBS | BODY MASS INDEX: 21.55 KG/M2 | HEART RATE: 64 BPM | SYSTOLIC BLOOD PRESSURE: 131 MMHG | OXYGEN SATURATION: 97 % | HEIGHT: 76 IN

## 2023-12-14 DIAGNOSIS — R55 VASOVAGAL SYNCOPE: Primary | ICD-10-CM

## 2023-12-14 PROCEDURE — 1159F MED LIST DOCD IN RCRD: CPT | Performed by: INTERNAL MEDICINE

## 2023-12-14 PROCEDURE — 1126F AMNT PAIN NOTED NONE PRSNT: CPT | Performed by: INTERNAL MEDICINE

## 2023-12-14 PROCEDURE — 99215 OFFICE O/P EST HI 40 MIN: CPT | Performed by: INTERNAL MEDICINE

## 2023-12-14 NOTE — PATIENT INSTRUCTIONS
To reach Dr. Evangelista's office please call [phone] ([facility]). Fax [phone]. Call [phone] to schedule an appointment. You may also contact the HF RNs at [email or web address]    Thank you for coming to your appointment today. If you have any questions or need cardiac medication refills, please call the Heart Failure Office at [phone] option [phone].   You may also contact the HF RNs at [email or web address]     Please schedule an exercise stress echocardiogram at [phone]  Once you get your test then schedule a phone call with Dr. Evangelista at [phone] for the results

## 2023-12-14 NOTE — PROGRESS NOTES
"Advanced Heart Failure and Cardiac Transplantation Cardiology    Kamron Grimm is a 68 y.o. male from [place], [place]. Works as a . Lives with his sister. At our last visit in 9/2023 I advised him to increase losartan dose to 100mg every day.    Per Dr. Linda Harry MD note:    About 3 weeks ago: \"He says he was at a grocery store while waiting online for checkout he became diaphoretic 'sweating profusely' and presyncopal. No chest pain no shortness of breath no palpitations. Feeling lasted for about 30 minutes since he recovered from it he never went to the emergency room.\"    Exam: /71 (BP Location: Right arm, Patient Position: Sitting)   Pulse 64   Ht 1.93 m (6' 4\")   Wt 80.3 kg (177 lb)   SpO2 97%   BMI 21.55 kg/m²   No JVD  RRR, no murmur at rest or with valsalva  No LE edema    Current Outpatient Medications   Medication Instructions    amLODIPine (NORVASC) 5 mg, oral, Daily    aspirin 81 mg EC tablet 1 tablet, oral, Daily    atorvastatin (LIPITOR) 80 mg, oral, Daily    cholecalciferol (Vitamin D-3) 25 MCG (1000 UT) tablet 1 tablet, oral, Daily    clopidogrel (PLAVIX) 75 mg, oral, Daily    EPINEPHrine 0.3 mg/0.3 mL injection syringe 0.3 mL, intramuscular    isosorbide mononitrate ER (IMDUR) 60 mg, oral, Daily, Do not crush or chew.    losartan (COZAAR) 50 mg, oral, Daily    metoprolol succinate XL (TOPROL-XL) 25 mg, oral, Daily    nicotine polacrilex (NICORETTE) 4 mg, Mouth/Throat, As needed, Chew once piece slowly and intermittently for 30 minutes.  Repeat every 1-2 hours maximum of 24 pieces a day.    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 Because he does have known significant basal septal hypertrophy I think that further evaluation to r/o LVOT obstruction, and because he is a smoker further evaluation to r/o WMA with exercise, are all appropriate.    Plan:  -- Proceed with stress echocardiogram  -- Will plan on telephonic visit to discuss results and next steps    Altaf Evangelista MD, MPH  Advanced Heart Failure and Transplant Cardiology  [facility]  [facility]

## 2023-12-14 NOTE — Clinical Note
Linda, Thanks for sending this patient back. Please see my note, my plan is to further assess risk with a stress echocardiogram. Altaf

## 2023-12-15 DIAGNOSIS — I25.118 CORONARY ARTERY DISEASE INVOLVING NATIVE HEART WITH OTHER FORM OF ANGINA PECTORIS, UNSPECIFIED VESSEL OR LESION TYPE (CMS-HCC): ICD-10-CM

## 2023-12-15 RX ORDER — METOPROLOL SUCCINATE 25 MG/1
25 TABLET, EXTENDED RELEASE ORAL DAILY
Qty: 90 TABLET | Refills: 2 | Status: SHIPPED | OUTPATIENT
Start: 2023-12-15

## 2023-12-15 RX ORDER — CLOPIDOGREL BISULFATE 75 MG/1
75 TABLET ORAL DAILY
Qty: 90 TABLET | Refills: 1 | Status: SHIPPED | OUTPATIENT
Start: 2023-12-15

## 2023-12-27 ENCOUNTER — APPOINTMENT (OUTPATIENT)
Dept: UROLOGY | Facility: HOSPITAL | Age: 68
End: 2023-12-27
Payer: COMMERCIAL

## 2023-12-29 ENCOUNTER — HOSPITAL ENCOUNTER (OUTPATIENT)
Dept: CARDIOLOGY | Facility: HOSPITAL | Age: 68
Discharge: HOME | End: 2023-12-29
Payer: COMMERCIAL

## 2023-12-29 DIAGNOSIS — R55 VASOVAGAL SYNCOPE: Primary | ICD-10-CM

## 2023-12-29 DIAGNOSIS — R55 VASOVAGAL SYNCOPE: ICD-10-CM

## 2023-12-29 PROBLEM — E87.6 HYPOKALEMIA: Status: ACTIVE | Noted: 2023-12-29

## 2023-12-29 PROBLEM — D64.9 ANEMIA: Status: ACTIVE | Noted: 2023-12-29

## 2023-12-29 PROBLEM — N18.4 STAGE 4 CHRONIC KIDNEY DISEASE (MULTI): Status: ACTIVE | Noted: 2023-12-29

## 2023-12-29 PROBLEM — I71.40 ABDOMINAL AORTIC ANEURYSM, WITHOUT RUPTURE, UNSPECIFIED (CMS-HCC): Status: ACTIVE | Noted: 2023-01-05

## 2023-12-29 PROBLEM — H52.13 MYOPIA OF BOTH EYES WITH REGULAR ASTIGMATISM: Status: ACTIVE | Noted: 2023-12-29

## 2023-12-29 PROBLEM — I95.9 HYPOTENSION: Status: ACTIVE | Noted: 2023-07-23

## 2023-12-29 PROBLEM — I10 HYPERTENSION: Status: ACTIVE | Noted: 2023-12-29

## 2023-12-29 PROBLEM — H52.03 HYPEROPIA OF BOTH EYES: Status: ACTIVE | Noted: 2020-11-11

## 2023-12-29 PROBLEM — F10.10 ALCOHOL ABUSE, CONTINUOUS: Status: ACTIVE | Noted: 2023-12-29

## 2023-12-29 PROBLEM — I42.2 PRIMARY HYPERTROPHIC CARDIOMYOPATHY (MULTI): Status: ACTIVE | Noted: 2023-12-29

## 2023-12-29 PROBLEM — R60.0 EDEMA OF LOWER EXTREMITY: Status: ACTIVE | Noted: 2023-12-29

## 2023-12-29 PROBLEM — H25.13 CATARACT, NUCLEAR SCLEROTIC, BOTH EYES: Status: ACTIVE | Noted: 2023-12-29

## 2023-12-29 PROBLEM — L98.9 INFLAMMATORY DERMATOSIS: Status: ACTIVE | Noted: 2023-12-29

## 2023-12-29 PROBLEM — I71.40 AAA (ABDOMINAL AORTIC ANEURYSM) (CMS-HCC): Status: ACTIVE | Noted: 2023-12-29

## 2023-12-29 PROBLEM — F17.200 SMOKING: Status: ACTIVE | Noted: 2023-12-29

## 2023-12-29 PROBLEM — K20.90 ESOPHAGITIS, UNSPECIFIED WITHOUT BLEEDING: Status: ACTIVE | Noted: 2023-04-10

## 2023-12-29 PROBLEM — I73.9 INTERMITTENT CLAUDICATION (CMS-HCC): Status: ACTIVE | Noted: 2023-12-29

## 2023-12-29 PROBLEM — R97.20 BPH WITH ELEVATED PSA: Status: ACTIVE | Noted: 2023-12-29

## 2023-12-29 PROBLEM — H52.223 MYOPIA OF BOTH EYES WITH REGULAR ASTIGMATISM: Status: ACTIVE | Noted: 2023-12-29

## 2023-12-29 PROBLEM — I21.4 NSTEMI, INITIAL EPISODE OF CARE (MULTI): Status: ACTIVE | Noted: 2023-12-29

## 2023-12-29 PROBLEM — I10 BENIGN ESSENTIAL HYPERTENSION: Status: ACTIVE | Noted: 2023-12-29

## 2023-12-29 PROBLEM — I20.89 CHRONIC STABLE ANGINA (CMS-HCC): Status: ACTIVE | Noted: 2023-12-29

## 2023-12-29 PROBLEM — N18.32 STAGE 3B CHRONIC KIDNEY DISEASE (MULTI): Status: ACTIVE | Noted: 2023-12-29

## 2023-12-29 PROBLEM — N40.0 BPH WITH ELEVATED PSA: Status: ACTIVE | Noted: 2023-12-29

## 2023-12-29 PROBLEM — N28.0: Status: ACTIVE | Noted: 2023-12-29

## 2023-12-29 PROBLEM — R13.10 DYSPHAGIA: Status: ACTIVE | Noted: 2023-12-29

## 2023-12-29 PROBLEM — R31.9 HEMATURIA: Status: ACTIVE | Noted: 2023-12-29

## 2023-12-29 PROBLEM — T78.3XXA ANGIOEDEMA: Status: ACTIVE | Noted: 2023-12-29

## 2023-12-29 PROBLEM — I73.9 PERIPHERAL ARTERIAL DISEASE (CMS-HCC): Status: ACTIVE | Noted: 2023-06-22

## 2023-12-29 PROBLEM — E78.5 HYPERLIPIDEMIA: Status: ACTIVE | Noted: 2023-12-29

## 2023-12-29 PROBLEM — R91.8 LUNG MASS: Status: ACTIVE | Noted: 2023-12-29

## 2023-12-29 PROBLEM — D75.839 THROMBOCYTOSIS: Status: ACTIVE | Noted: 2023-12-29

## 2023-12-29 PROBLEM — I25.10 ARTERIOSCLEROSIS OF CORONARY ARTERY: Status: ACTIVE | Noted: 2022-12-22

## 2023-12-29 PROCEDURE — 93016 CV STRESS TEST SUPVJ ONLY: CPT | Performed by: INTERNAL MEDICINE

## 2023-12-29 PROCEDURE — 93018 CV STRESS TEST I&R ONLY: CPT | Performed by: INTERNAL MEDICINE

## 2023-12-29 PROCEDURE — 93350 STRESS TTE ONLY: CPT | Performed by: INTERNAL MEDICINE

## 2023-12-29 PROCEDURE — 2500000004 HC RX 250 GENERAL PHARMACY W/ HCPCS (ALT 636 FOR OP/ED): Performed by: INTERNAL MEDICINE

## 2023-12-29 PROCEDURE — 93017 CV STRESS TEST TRACING ONLY: CPT

## 2023-12-29 RX ORDER — CETIRIZINE HYDROCHLORIDE 10 MG/1
TABLET ORAL
COMMUNITY
Start: 2022-08-20 | End: 2024-01-18 | Stop reason: ALTCHOICE

## 2023-12-29 RX ORDER — ESOMEPRAZOLE MAGNESIUM 40 MG/1
40 CAPSULE, DELAYED RELEASE ORAL
COMMUNITY
Start: 2023-06-15 | End: 2024-06-14

## 2023-12-29 RX ORDER — AMOXICILLIN 500 MG/1
500 CAPSULE ORAL 3 TIMES DAILY
COMMUNITY
Start: 2011-08-02 | End: 2024-01-18 | Stop reason: ALTCHOICE

## 2023-12-29 RX ORDER — CHLORTHALIDONE 25 MG/1
TABLET ORAL
COMMUNITY
Start: 2022-12-22 | End: 2024-06-05 | Stop reason: WASHOUT

## 2023-12-29 RX ORDER — ACETAMINOPHEN 325 MG/1
TABLET ORAL EVERY 6 HOURS PRN
COMMUNITY
Start: 2015-07-10 | End: 2024-01-18 | Stop reason: ALTCHOICE

## 2023-12-29 RX ORDER — CICLOPIROX 80 MG/ML
1 SOLUTION TOPICAL SEE ADMIN INSTRUCTIONS
COMMUNITY
Start: 2023-06-04 | End: 2024-01-18 | Stop reason: ALTCHOICE

## 2023-12-29 RX ORDER — ALFUZOSIN HYDROCHLORIDE 10 MG/1
10 TABLET, EXTENDED RELEASE ORAL
COMMUNITY
Start: 2018-10-22

## 2023-12-29 RX ORDER — ZOSTER VACCINE RECOMBINANT, ADJUVANTED 50 MCG/0.5
KIT INTRAMUSCULAR
COMMUNITY
Start: 2023-02-15

## 2023-12-29 RX ADMIN — PERFLUTREN 2 ML OF DILUTION: 6.52 INJECTION, SUSPENSION INTRAVENOUS at 14:45

## 2024-01-17 ENCOUNTER — LAB (OUTPATIENT)
Dept: LAB | Facility: LAB | Age: 69
End: 2024-01-17
Payer: COMMERCIAL

## 2024-01-17 DIAGNOSIS — N18.32 CHRONIC KIDNEY DISEASE, STAGE 3B (MULTI): Primary | ICD-10-CM

## 2024-01-17 LAB
25(OH)D3 SERPL-MCNC: 48 NG/ML (ref 30–100)
ALBUMIN SERPL BCP-MCNC: 3.8 G/DL (ref 3.4–5)
ANION GAP SERPL CALC-SCNC: 12 MMOL/L (ref 10–20)
BUN SERPL-MCNC: 16 MG/DL (ref 6–23)
CALCIUM SERPL-MCNC: 9.2 MG/DL (ref 8.6–10.3)
CHLORIDE SERPL-SCNC: 106 MMOL/L (ref 98–107)
CO2 SERPL-SCNC: 27 MMOL/L (ref 21–32)
CREAT SERPL-MCNC: 1.6 MG/DL (ref 0.5–1.3)
CREAT UR-MCNC: 121.7 MG/DL (ref 20–370)
CREAT UR-MCNC: 122.3 MG/DL (ref 20–370)
EGFRCR SERPLBLD CKD-EPI 2021: 47 ML/MIN/1.73M*2
ERYTHROCYTE [DISTWIDTH] IN BLOOD BY AUTOMATED COUNT: 12.9 % (ref 11.5–14.5)
GLUCOSE SERPL-MCNC: 65 MG/DL (ref 74–99)
HCT VFR BLD AUTO: 42.2 % (ref 41–52)
HGB BLD-MCNC: 14.7 G/DL (ref 13.5–17.5)
MCH RBC QN AUTO: 34.1 PG (ref 26–34)
MCHC RBC AUTO-ENTMCNC: 34.8 G/DL (ref 32–36)
MCV RBC AUTO: 98 FL (ref 80–100)
MICROALBUMIN UR-MCNC: <7 MG/L
MICROALBUMIN/CREAT UR: NORMAL MG/G{CREAT}
NRBC BLD-RTO: 0 /100 WBCS (ref 0–0)
PHOSPHATE SERPL-MCNC: 3.2 MG/DL (ref 2.5–4.9)
PLATELET # BLD AUTO: 360 X10*3/UL (ref 150–450)
POTASSIUM SERPL-SCNC: 5 MMOL/L (ref 3.5–5.3)
PROT UR-ACNC: 13 MG/DL (ref 5–25)
PROT/CREAT UR: 0.11 MG/MG CREAT (ref 0–0.17)
RBC # BLD AUTO: 4.31 X10*6/UL (ref 4.5–5.9)
RBC #/AREA URNS AUTO: NORMAL /HPF
SODIUM SERPL-SCNC: 140 MMOL/L (ref 136–145)
SQUAMOUS #/AREA URNS AUTO: NORMAL /HPF
WBC # BLD AUTO: 3.9 X10*3/UL (ref 4.4–11.3)
WBC #/AREA URNS AUTO: NORMAL /HPF

## 2024-01-17 PROCEDURE — 36415 COLL VENOUS BLD VENIPUNCTURE: CPT

## 2024-01-17 PROCEDURE — 85027 COMPLETE CBC AUTOMATED: CPT

## 2024-01-17 PROCEDURE — 82043 UR ALBUMIN QUANTITATIVE: CPT

## 2024-01-17 PROCEDURE — 84156 ASSAY OF PROTEIN URINE: CPT

## 2024-01-17 PROCEDURE — 80069 RENAL FUNCTION PANEL: CPT

## 2024-01-17 PROCEDURE — 82570 ASSAY OF URINE CREATININE: CPT

## 2024-01-17 PROCEDURE — 81001 URINALYSIS AUTO W/SCOPE: CPT

## 2024-01-17 PROCEDURE — 82306 VITAMIN D 25 HYDROXY: CPT

## 2024-01-18 ENCOUNTER — OFFICE VISIT (OUTPATIENT)
Dept: NEPHROLOGY | Facility: CLINIC | Age: 69
End: 2024-01-18
Payer: COMMERCIAL

## 2024-01-18 VITALS
HEIGHT: 76 IN | WEIGHT: 174 LBS | SYSTOLIC BLOOD PRESSURE: 128 MMHG | DIASTOLIC BLOOD PRESSURE: 73 MMHG | BODY MASS INDEX: 21.19 KG/M2 | HEART RATE: 67 BPM

## 2024-01-18 DIAGNOSIS — N18.32 STAGE 3B CHRONIC KIDNEY DISEASE (MULTI): Primary | ICD-10-CM

## 2024-01-18 PROCEDURE — 1126F AMNT PAIN NOTED NONE PRSNT: CPT | Performed by: INTERNAL MEDICINE

## 2024-01-18 PROCEDURE — 3078F DIAST BP <80 MM HG: CPT | Performed by: INTERNAL MEDICINE

## 2024-01-18 PROCEDURE — 3074F SYST BP LT 130 MM HG: CPT | Performed by: INTERNAL MEDICINE

## 2024-01-18 PROCEDURE — 1159F MED LIST DOCD IN RCRD: CPT | Performed by: INTERNAL MEDICINE

## 2024-01-18 PROCEDURE — 99213 OFFICE O/P EST LOW 20 MIN: CPT | Performed by: INTERNAL MEDICINE

## 2024-01-18 NOTE — PROGRESS NOTES
Chief Complaint: Follow up CKD    No specific complaints  Denies nausea, vomiting, chest pain, dyspnea  No urinary symptoms    NAD  Sclera AI s inj  MMM, no sores  Deferred secondary to COVID  No edema  No tremor  No rash    CKD stage 3b  HTN well controlled  Proteinuria none significant  Tob abuse    Tob cessation counseling  Discussed risks/benefits/indications of SGLT2   Discussed specifically eating more than 35 g carbs per day  Labs and follow up in 3 months  [facility] pharmacy referral

## 2024-02-27 ENCOUNTER — TELEPHONE (OUTPATIENT)
Dept: CARDIOLOGY | Facility: HOSPITAL | Age: 69
End: 2024-02-27
Payer: COMMERCIAL

## 2024-02-27 DIAGNOSIS — I10 BENIGN ESSENTIAL HYPERTENSION: Primary | ICD-10-CM

## 2024-02-27 RX ORDER — ISOSORBIDE MONONITRATE 60 MG/1
60 TABLET, EXTENDED RELEASE ORAL DAILY
Qty: 90 TABLET | Refills: 3 | Status: SHIPPED | OUTPATIENT
Start: 2024-02-27 | End: 2025-02-26

## 2024-03-04 ENCOUNTER — OFFICE VISIT (OUTPATIENT)
Dept: PRIMARY CARE | Facility: CLINIC | Age: 69
End: 2024-03-04
Payer: COMMERCIAL

## 2024-03-04 VITALS
SYSTOLIC BLOOD PRESSURE: 139 MMHG | HEIGHT: 76 IN | HEART RATE: 58 BPM | DIASTOLIC BLOOD PRESSURE: 80 MMHG | WEIGHT: 174.2 LBS | BODY MASS INDEX: 21.21 KG/M2 | TEMPERATURE: 98 F

## 2024-03-04 DIAGNOSIS — I10 HYPERTENSION, UNSPECIFIED TYPE: ICD-10-CM

## 2024-03-04 DIAGNOSIS — N28.0: ICD-10-CM

## 2024-03-04 DIAGNOSIS — N18.32 STAGE 3B CHRONIC KIDNEY DISEASE (MULTI): ICD-10-CM

## 2024-03-04 DIAGNOSIS — N18.32 STAGE 3B CHRONIC KIDNEY DISEASE (MULTI): Primary | ICD-10-CM

## 2024-03-04 DIAGNOSIS — I71.40 ABDOMINAL AORTIC ANEURYSM (AAA) WITHOUT RUPTURE, UNSPECIFIED PART (CMS-HCC): Primary | ICD-10-CM

## 2024-03-04 DIAGNOSIS — N18.4 STAGE 4 CHRONIC KIDNEY DISEASE (MULTI): ICD-10-CM

## 2024-03-04 DIAGNOSIS — F17.200 SMOKING: ICD-10-CM

## 2024-03-04 DIAGNOSIS — Z87.891 PERSONAL HISTORY OF NICOTINE DEPENDENCE: ICD-10-CM

## 2024-03-04 DIAGNOSIS — I21.4 NSTEMI, INITIAL EPISODE OF CARE (MULTI): ICD-10-CM

## 2024-03-04 PROCEDURE — 3079F DIAST BP 80-89 MM HG: CPT | Performed by: FAMILY MEDICINE

## 2024-03-04 PROCEDURE — 1126F AMNT PAIN NOTED NONE PRSNT: CPT | Performed by: FAMILY MEDICINE

## 2024-03-04 PROCEDURE — 1160F RVW MEDS BY RX/DR IN RCRD: CPT | Performed by: FAMILY MEDICINE

## 2024-03-04 PROCEDURE — 3075F SYST BP GE 130 - 139MM HG: CPT | Performed by: FAMILY MEDICINE

## 2024-03-04 PROCEDURE — 4004F PT TOBACCO SCREEN RCVD TLK: CPT | Performed by: FAMILY MEDICINE

## 2024-03-04 PROCEDURE — 1159F MED LIST DOCD IN RCRD: CPT | Performed by: FAMILY MEDICINE

## 2024-03-04 PROCEDURE — 99214 OFFICE O/P EST MOD 30 MIN: CPT | Performed by: FAMILY MEDICINE

## 2024-03-04 RX ORDER — METOPROLOL SUCCINATE 25 MG/1
25 TABLET, EXTENDED RELEASE ORAL DAILY
COMMUNITY
End: 2024-05-20 | Stop reason: ALTCHOICE

## 2024-03-04 RX ORDER — LOSARTAN POTASSIUM 100 MG/1
100 TABLET ORAL DAILY
COMMUNITY

## 2024-03-04 RX ORDER — SPIRONOLACTONE 25 MG/1
25 TABLET ORAL DAILY
Qty: 90 TABLET | Refills: 3 | Status: SHIPPED | OUTPATIENT
Start: 2024-03-04

## 2024-03-04 RX ORDER — TAMSULOSIN HYDROCHLORIDE 0.4 MG/1
0.8 CAPSULE ORAL DAILY
COMMUNITY
End: 2024-03-18 | Stop reason: SDUPTHER

## 2024-03-04 ASSESSMENT — ENCOUNTER SYMPTOMS
OCCASIONAL FEELINGS OF UNSTEADINESS: 0
DEPRESSION: 0
LOSS OF SENSATION IN FEET: 0

## 2024-03-04 NOTE — PROGRESS NOTES
This is a 68-year-old male patient with coronary artery disease chronic renal failure and BPH        I reviewed all his medications and I see that spironolactone is missing I renewed that prescription    Also advised him to take the Flomax as prescribed by urology to take 0.8 mg at night        Also I reviewed the nephrology notes and he was advised on his SGLT 2 but I do not see a prescription and I advised him to call the nephrologist to find out whether the prescription is ready to be picked up at the pharmacy    Again I discussed at length about smoking but he is not ready to quit he says on and off he uses a nicotine gum but refuses for counseling he says it is a waste of time        I ordered the CT of the thorax for early detection of lung cancer and also ultrasound of the aorta was placed for ruling out aortic aneurysm advised patient to come back in 3 months--- that will be time for his physical in June last physical was on Freda 15

## 2024-03-15 NOTE — PROGRESS NOTES
"Subjective   Patient ID: Kamron Grimm is a 68 y.o. male who presents for No chief complaint on file..    Referring Provider: Linda Harry MD     HPI  HPI: CKD stage 3a/a1 last EGFR 47 sCr 1.6    HTN: elevated at last ov. Unsure about if should be taking spironolactone cardio note from 9/19/23 notes discontinued, but restarted by pcp at last appt.  /80  Medications  Spironolactone 25mg every day  Metoprolol 25mg every day  Isosorbide 60mg every day  Amlodipine 5mg every day  Losartan 100mg every day    Glucose: well controled and monitored    HLD:  LDL 42  On statin? Atorva 80      Medications reviewed for appropriate dosing in setting of CKD  Recommended changes:  -no adjustments needed at this time    Objective     There were no vitals taken for this visit.     Labs  Lab Results   Component Value Date    BILITOT 0.9 07/23/2023    CALCIUM 9.2 01/17/2024    CO2 27 01/17/2024     01/17/2024    CREATININE 1.60 (H) 01/17/2024    GLUCOSE 65 (L) 01/17/2024    ALKPHOS 59 07/23/2023    K 5.0 01/17/2024    PROT 5.9 (L) 07/23/2023     01/17/2024    AST 12 07/23/2023    ALT 6 (L) 07/23/2023    BUN 16 01/17/2024    ANIONGAP 12 01/17/2024    MG 1.80 07/22/2023    PHOS 3.2 01/17/2024    ALBUMIN 3.8 01/17/2024    GFRMALE 36 (A) 09/12/2023     Lab Results   Component Value Date    TRIG 154 (H) 06/15/2023    CHOL 121 06/15/2023    HDL 48.4 06/15/2023     No results found for: \"HGBA1C\"    Current Outpatient Medications on File Prior to Visit   Medication Sig Dispense Refill    alfuzosin (Uroxatral) 10 mg 24 hr tablet Take 1 tablet (10 mg) by mouth once daily.      amLODIPine (Norvasc) 5 mg tablet Take 1 tablet (5 mg) by mouth once daily. 90 tablet 1    aspirin 81 mg EC tablet Take 1 tablet (81 mg) by mouth once daily.      atorvastatin (Lipitor) 80 mg tablet Take 1 tablet (80 mg) by mouth once daily. 90 tablet 1    chlorthalidone (Hygroton) 25 mg tablet Take by mouth once daily.      cholecalciferol " (Vitamin D-3) 25 MCG (1000 UT) tablet Take 1 tablet (25 mcg) by mouth once daily.      clopidogrel (Plavix) 75 mg tablet Take 1 tablet (75 mg) by mouth once daily. 90 tablet 1    EPINEPHrine 0.3 mg/0.3 mL injection syringe Inject 0.3 mL (0.3 mg) into the muscle.      esomeprazole (NexIUM) 40 mg DR capsule Take 1 capsule (40 mg) by mouth.      isosorbide mononitrate ER (Imdur) 60 mg 24 hr tablet Take 1 tablet (60 mg) by mouth once daily. Do not crush or chew. 90 tablet 3    losartan (Cozaar) 100 mg tablet Take 1 tablet (100 mg) by mouth once daily.      metoprolol succinate XL (Toprol-XL) 25 mg 24 hr tablet Take 1 tablet (25 mg) by mouth once daily. 90 tablet 2    metoprolol succinate XL (Toprol-XL) 25 mg 24 hr tablet Take 1 tablet (25 mg) by mouth once daily. Do not crush or chew.      nicotine polacrilex (Nicorette) 4 mg gum Chew 1 each (4 mg) if needed for smoking cessation. Chew once piece slowly and intermittently for 30 minutes.  Repeat every 1-2 hours maximum of 24 pieces a day.      spironolactone (Aldactone) 25 mg tablet Take 1 tablet (25 mg) by mouth once daily. 90 tablet 3    tamsulosin (Flomax) 0.4 mg 24 hr capsule Take 2 capsules (0.8 mg) by mouth once daily.      zoster vaccine-recombinant adjuvanted (Shingrix, PF,) 50 mcg/0.5 mL vaccine Inject into the muscle.       No current facility-administered medications on file prior to visit.        Assessment/Plan   PATIENT EDUCATION/DISCUSSION:  - Counseled patient on MOA, expectations, side effects, duration of therapy, contraindications, administration, and monitoring parameters  - Answered all patient questions and concerns  - dual enrolled $0 for 1month farxiga    _______________________________________________________________________  PLAN  1. START farxiga 5mg rx sent to [facility] in [facility] C Houser, PharmD    Continue all meds under the continuation of care with the referring provider and clinical pharmacy team.

## 2024-03-18 ENCOUNTER — TELEMEDICINE (OUTPATIENT)
Dept: PHARMACY | Facility: HOSPITAL | Age: 69
End: 2024-03-18
Payer: COMMERCIAL

## 2024-03-18 DIAGNOSIS — N13.8 BPH WITH OBSTRUCTION/LOWER URINARY TRACT SYMPTOMS: Primary | ICD-10-CM

## 2024-03-18 DIAGNOSIS — N40.1 BPH WITH OBSTRUCTION/LOWER URINARY TRACT SYMPTOMS: Primary | ICD-10-CM

## 2024-03-18 DIAGNOSIS — N18.32 STAGE 3B CHRONIC KIDNEY DISEASE (MULTI): ICD-10-CM

## 2024-03-18 RX ORDER — DAPAGLIFLOZIN 5 MG/1
5 TABLET, FILM COATED ORAL DAILY
Qty: 30 TABLET | Refills: 1 | Status: SHIPPED | OUTPATIENT
Start: 2024-03-18 | End: 2024-04-15 | Stop reason: SDUPTHER

## 2024-03-18 RX ORDER — TAMSULOSIN HYDROCHLORIDE 0.4 MG/1
0.8 CAPSULE ORAL DAILY
Qty: 180 CAPSULE | Refills: 3 | Status: SHIPPED | OUTPATIENT
Start: 2024-03-18

## 2024-03-18 NOTE — Clinical Note
Hi Dr gonzalez, This patient of yours was referred to me by their nephrologist to discuss starting an sglt2. During our visit noticed that spironolactone was recently added at his last pcp visit. But it looks like the med was discontinued by your team back in September of 2023 due to syncope. Just wanted to double check with your office on your opinion of restarting this.  Thanks Barry RiveraD BCPS

## 2024-03-21 ENCOUNTER — HOSPITAL ENCOUNTER (OUTPATIENT)
Dept: RADIOLOGY | Facility: HOSPITAL | Age: 69
Discharge: HOME | End: 2024-03-21
Payer: COMMERCIAL

## 2024-03-21 ENCOUNTER — HOSPITAL ENCOUNTER (OUTPATIENT)
Dept: VASCULAR MEDICINE | Facility: HOSPITAL | Age: 69
Discharge: HOME | End: 2024-03-21
Payer: COMMERCIAL

## 2024-03-21 DIAGNOSIS — Z87.891 PERSONAL HISTORY OF NICOTINE DEPENDENCE: ICD-10-CM

## 2024-03-21 DIAGNOSIS — I71.40 ABDOMINAL AORTIC ANEURYSM (AAA) WITHOUT RUPTURE, UNSPECIFIED PART (CMS-HCC): ICD-10-CM

## 2024-03-21 DIAGNOSIS — F17.200 SMOKING: ICD-10-CM

## 2024-03-21 DIAGNOSIS — Z13.6 ENCOUNTER FOR SCREENING FOR CARDIOVASCULAR DISORDERS: ICD-10-CM

## 2024-03-21 PROCEDURE — 76706 US ABDL AORTA SCREEN AAA: CPT | Performed by: SURGERY

## 2024-03-21 PROCEDURE — 76706 US ABDL AORTA SCREEN AAA: CPT

## 2024-03-21 PROCEDURE — 71271 CT THORAX LUNG CANCER SCR C-: CPT

## 2024-04-15 ENCOUNTER — TELEMEDICINE (OUTPATIENT)
Dept: PHARMACY | Facility: HOSPITAL | Age: 69
End: 2024-04-15
Payer: COMMERCIAL

## 2024-04-15 DIAGNOSIS — N18.32 STAGE 3B CHRONIC KIDNEY DISEASE (MULTI): ICD-10-CM

## 2024-04-15 RX ORDER — DAPAGLIFLOZIN 5 MG/1
5 TABLET, FILM COATED ORAL DAILY
Qty: 30 TABLET | Refills: 2 | Status: SHIPPED | OUTPATIENT
Start: 2024-04-15 | End: 2024-05-20 | Stop reason: ALTCHOICE

## 2024-04-15 NOTE — PROGRESS NOTES
"Subjective   Patient ID: Kamron Grimm is a 68 y.o. male who presents for No chief complaint on file..    Referring Provider: Linda Harry MD     HPI  HPI: CKD stage 3a/a1 last EGFR 47 sCr 1.6  Now 1 month on farxiga 5mg tolerating well overall. Has noticed some dizziness/lightheadedness but manageable. Reeducated on staying well hydrated. we will continue with the 5mg dose for now and reevaluate increase after follow with dr goddard on 5/20    HTN: elevated at last ov.   /80  Medications  Spironolactone 25mg every day  Metoprolol 25mg every day  Isosorbide 60mg every day  Amlodipine 5mg every day  Losartan 100mg every day    Glucose: well controled and monitored    HLD:  LDL 42  On statin? Atorva 80      Medications reviewed for appropriate dosing in setting of CKD  Recommended changes:  -no adjustments needed at this time    Objective     There were no vitals taken for this visit.     Labs  Lab Results   Component Value Date    BILITOT 0.9 07/23/2023    CALCIUM 9.2 01/17/2024    CO2 27 01/17/2024     01/17/2024    CREATININE 1.60 (H) 01/17/2024    GLUCOSE 65 (L) 01/17/2024    ALKPHOS 59 07/23/2023    K 5.0 01/17/2024    PROT 5.9 (L) 07/23/2023     01/17/2024    AST 12 07/23/2023    ALT 6 (L) 07/23/2023    BUN 16 01/17/2024    ANIONGAP 12 01/17/2024    MG 1.80 07/22/2023    PHOS 3.2 01/17/2024    ALBUMIN 3.8 01/17/2024    GFRMALE 36 (A) 09/12/2023     Lab Results   Component Value Date    TRIG 154 (H) 06/15/2023    CHOL 121 06/15/2023    HDL 48.4 06/15/2023     No results found for: \"HGBA1C\"    Current Outpatient Medications on File Prior to Visit   Medication Sig Dispense Refill    alfuzosin (Uroxatral) 10 mg 24 hr tablet Take 1 tablet (10 mg) by mouth once daily.      amLODIPine (Norvasc) 5 mg tablet Take 1 tablet (5 mg) by mouth once daily. 90 tablet 1    aspirin 81 mg EC tablet Take 1 tablet (81 mg) by mouth once daily.      atorvastatin (Lipitor) 80 mg tablet Take 1 tablet (80 mg) " by mouth once daily. 90 tablet 1    chlorthalidone (Hygroton) 25 mg tablet Take by mouth once daily.      cholecalciferol (Vitamin D-3) 25 MCG (1000 UT) tablet Take 1 tablet (25 mcg) by mouth once daily.      clopidogrel (Plavix) 75 mg tablet Take 1 tablet (75 mg) by mouth once daily. 90 tablet 1    EPINEPHrine 0.3 mg/0.3 mL injection syringe Inject 0.3 mL (0.3 mg) into the muscle.      esomeprazole (NexIUM) 40 mg DR capsule Take 1 capsule (40 mg) by mouth.      Farxiga 5 mg Take 1 tablet (5 mg) by mouth once daily. TAKE ONE TABLET BY MOUTH ONCE DAILY IN THE MORNING 30 tablet 1    isosorbide mononitrate ER (Imdur) 60 mg 24 hr tablet Take 1 tablet (60 mg) by mouth once daily. Do not crush or chew. 90 tablet 3    losartan (Cozaar) 100 mg tablet Take 1 tablet (100 mg) by mouth once daily.      metoprolol succinate XL (Toprol-XL) 25 mg 24 hr tablet Take 1 tablet (25 mg) by mouth once daily. 90 tablet 2    metoprolol succinate XL (Toprol-XL) 25 mg 24 hr tablet Take 1 tablet (25 mg) by mouth once daily. Do not crush or chew.      nicotine polacrilex (Nicorette) 4 mg gum Chew 1 each (4 mg) if needed for smoking cessation. Chew once piece slowly and intermittently for 30 minutes.  Repeat every 1-2 hours maximum of 24 pieces a day.      spironolactone (Aldactone) 25 mg tablet Take 1 tablet (25 mg) by mouth once daily. 90 tablet 3    tamsulosin (Flomax) 0.4 mg 24 hr capsule Take 2 capsules (0.8 mg) by mouth once daily. 180 capsule 3    zoster vaccine-recombinant adjuvanted (Shingrix, PF,) 50 mcg/0.5 mL vaccine Inject into the muscle.       No current facility-administered medications on file prior to visit.        Assessment/Plan   PATIENT EDUCATION/DISCUSSION:  - Counseled patient on MOA, expectations, side effects, duration of therapy, contraindications, administration, and monitoring parameters  - Answered all patient questions and concerns  - dual enrolled $0 for 1month  farxiga    _______________________________________________________________________  PLAN  1. CONTINUE farxiga 5mg rx sent to [facility] in [facility]  Barry Houser PharmD    Continue all meds under the continuation of care with the referring provider and clinical pharmacy team.

## 2024-04-16 PROBLEM — K22.10 EROSIVE ESOPHAGITIS: Status: ACTIVE | Noted: 2023-04-10

## 2024-04-16 PROBLEM — K40.90 RIGHT INGUINAL HERNIA: Status: ACTIVE | Noted: 2024-04-16

## 2024-04-16 PROBLEM — R07.9 CHEST PAIN: Status: ACTIVE | Noted: 2024-04-16

## 2024-04-16 PROBLEM — F19.21 HISTORY OF SUBSTANCE DEPENDENCE (MULTI): Status: ACTIVE | Noted: 2024-04-16

## 2024-04-17 ENCOUNTER — OFFICE VISIT (OUTPATIENT)
Dept: VASCULAR SURGERY | Facility: HOSPITAL | Age: 69
End: 2024-04-17
Payer: COMMERCIAL

## 2024-04-17 VITALS
WEIGHT: 175.4 LBS | SYSTOLIC BLOOD PRESSURE: 135 MMHG | HEIGHT: 76 IN | HEART RATE: 66 BPM | OXYGEN SATURATION: 99 % | BODY MASS INDEX: 21.36 KG/M2 | DIASTOLIC BLOOD PRESSURE: 71 MMHG

## 2024-04-17 DIAGNOSIS — I71.40 ABDOMINAL AORTIC ANEURYSM (AAA) WITHOUT RUPTURE, UNSPECIFIED PART (CMS-HCC): ICD-10-CM

## 2024-04-17 PROCEDURE — 1159F MED LIST DOCD IN RCRD: CPT | Performed by: SURGERY

## 2024-04-17 PROCEDURE — 99203 OFFICE O/P NEW LOW 30 MIN: CPT | Performed by: SURGERY

## 2024-04-17 PROCEDURE — 3078F DIAST BP <80 MM HG: CPT | Performed by: SURGERY

## 2024-04-17 PROCEDURE — 99213 OFFICE O/P EST LOW 20 MIN: CPT | Performed by: SURGERY

## 2024-04-17 PROCEDURE — 3075F SYST BP GE 130 - 139MM HG: CPT | Performed by: SURGERY

## 2024-04-17 PROCEDURE — 1160F RVW MEDS BY RX/DR IN RCRD: CPT | Performed by: SURGERY

## 2024-04-17 PROCEDURE — 1126F AMNT PAIN NOTED NONE PRSNT: CPT | Performed by: SURGERY

## 2024-04-17 ASSESSMENT — PAIN SCALES - GENERAL: PAINLEVEL: 0-NO PAIN

## 2024-04-17 NOTE — PROGRESS NOTES
Vascular Surgery Clinic Note    CC: Abdominal Aortic Aneurysm    History Of Present Illness:   Kamron Grimm is a 68 y.o. male here for fusiform AAA 3.1 cm, an incidental finding. His past medical history includes HTN, HLD, NSTEMI, CKD III. He denies chest pain, abdominal pain, back pain and shortness of breath. He denies a family history of aneurysm. He smokes 5-6 cigarettes per day and has tried to quit in the past. He walks daily and denies claudication symptoms.     Medical History:  Patient Active Problem List   Diagnosis    Abdominal aortic aneurysm, without rupture, unspecified (CMS-[facility])    Hypotension    Hematuria    Hyperlipidemia    Hyperopia of both eyes    Edema of lower extremity    Intermittent claudication (CMS-[facility])    Hypokalemia    NSTEMI, initial episode of care ([facility])    Myopia of both eyes with regular astigmatism    Vision blurring    Thrombocytosis    Syncope and collapse    Stage 3b chronic kidney disease (Multi)    Smoking    Lung mass    Peripheral arterial disease (CMS-[facility])    Renal artery obstruction (Multi)    Dysphagia    Erectile dysfunction    Esophagitis, unspecified without bleeding    Inflammatory dermatosis    Stage 4 chronic kidney disease (Multi)    Chronic stable angina (CMS-[facility])    Cataract, nuclear sclerotic, both eyes    BPH with elevated PSA    Benign essential hypertension    Arteriosclerosis of coronary artery    Alcohol abuse, continuous    Anemia    Angioedema    Primary hypertrophic cardiomyopathy (Multi)    AAA (abdominal aortic aneurysm) (CMS-[facility])    Hypertension    Chest pain    Erosive esophagitis    History of substance dependence (Multi)    Right inguinal hernia        SH:    Social Determinants of Health     Tobacco Use: High Risk (4/17/2024)    Patient History     Smoking Tobacco Use: Every Day     Smokeless Tobacco Use: Never     Passive Exposure: Past   Alcohol Use: Not on file   Financial Resource Strain: Not on file   Food Insecurity: Not on file  "  Transportation Needs: Not on file   Physical Activity: Not on file   Stress: Not on file   Social Connections: Not on file   Intimate Partner Violence: Not on file   Depression: Not at risk (6/15/2023)    PHQ-2     PHQ-2 Score: 0   Housing Stability: Not on file   Utilities: Not on file   Digital Equity: Not on file   Health Literacy: Not on file        FH:  No family history on file.     Allergies:   Allergies   Allergen Reactions    Bee Venom Protein (Honey Bee) Swelling    Lisinopril Cough and Unknown    Prednisone Nausea/vomiting       ROS:  All systems were reviewed and noted to be negative, other than described above.     Objective:  Last Recorded Vitals  Blood pressure 135/71, pulse 66, height 1.93 m (6' 4\"), weight 79.6 kg (175 lb 6.4 oz), SpO2 99%.    Meds:   Current Outpatient Medications   Medication Instructions    alfuzosin (UROXATRAL) 10 mg, oral, Daily RT    amLODIPine (NORVASC) 5 mg, oral, Daily    aspirin 81 mg EC tablet 1 tablet, oral, Daily    atorvastatin (LIPITOR) 80 mg, oral, Daily    chlorthalidone (Hygroton) 25 mg tablet oral, Daily RT    cholecalciferol (Vitamin D-3) 25 MCG (1000 UT) tablet 1 tablet, oral, Daily    clopidogrel (PLAVIX) 75 mg, oral, Daily    EPINEPHrine 0.3 mg/0.3 mL injection syringe 0.3 mL, intramuscular    esomeprazole (NEXIUM) 40 mg, oral    Farxiga 5 mg, oral, Daily, TAKE ONE TABLET BY MOUTH ONCE DAILY IN THE MORNING    isosorbide mononitrate ER (IMDUR) 60 mg, oral, Daily, Do not crush or chew.    losartan (COZAAR) 100 mg, oral, Daily    metoprolol succinate XL (TOPROL-XL) 25 mg, oral, Daily    metoprolol succinate XL (TOPROL-XL) 25 mg, oral, Daily, Do not crush or chew.    nicotine polacrilex (NICORETTE) 4 mg, Mouth/Throat, As needed, Chew once piece slowly and intermittently for 30 minutes.  Repeat every 1-2 hours maximum of 24 pieces a day.    spironolactone (ALDACTONE) 25 mg, oral, Daily    tamsulosin (FLOMAX) 0.8 mg, oral, Daily    zoster vaccine-recombinant " adjuvanted (Shingrix, PF,) 50 mcg/0.5 mL vaccine intramuscular       Exam:  Constitutional: Well appearing, NAD   PSYCH: Appropriate mood and affect  Eyes: Sclera clear  Neck: Supple, no carotid bruits noted.   CV: RRR  RESP: Unlabored breathing  GI: Soft, nontender, non-distended.   SKIN: No lesions  NEURO: No focal deficits noted. Motor/sensory intact.   EXTREMITIES: Warm & well perfused. No leg edema.   PULSES: Normal upper pulses.    Imaging Reviewed:  Vascular US Abd Aorta Aneurysm 03/21/24  CONCLUSIONS: Aorta/Common Iliac Arteries/IVC: Evidence of fusiform aneurysm at the distal aorta measuring 3.1cm. There is heterogeneous plaque visualize at the distal aorta.      Assessment & Plan:  1. Abdominal aortic aneurysm (AAA) without rupture, unspecified part (CMS-[facility])  Referral to Vascular Surgery         - Remain on current medications including ASA, statin therapy, and BP meds.   - Reviewed risk factors for aneurysmal growth including smoking and smoking.    - Encouraged him to stop smoking. He has Nicorette gum ordered.    - Reviewed the warning signs for rupture and to call 911 if they occur.   - Repeat US Abdominal Aneurysm in 3 years for AAA surveillance.       Leydi Ventura, APRN-CNP

## 2024-04-29 DIAGNOSIS — E78.9 LIPID DISORDER: ICD-10-CM

## 2024-04-29 RX ORDER — ATORVASTATIN CALCIUM 80 MG/1
80 TABLET, FILM COATED ORAL DAILY
Qty: 90 TABLET | Refills: 1 | Status: SHIPPED | OUTPATIENT
Start: 2024-04-29

## 2024-05-20 ENCOUNTER — APPOINTMENT (OUTPATIENT)
Dept: NEPHROLOGY | Facility: CLINIC | Age: 69
End: 2024-05-20
Payer: COMMERCIAL

## 2024-05-20 ENCOUNTER — LAB (OUTPATIENT)
Dept: LAB | Facility: LAB | Age: 69
End: 2024-05-20
Payer: COMMERCIAL

## 2024-05-20 ENCOUNTER — OFFICE VISIT (OUTPATIENT)
Dept: NEPHROLOGY | Facility: CLINIC | Age: 69
End: 2024-05-20
Payer: COMMERCIAL

## 2024-05-20 VITALS
SYSTOLIC BLOOD PRESSURE: 126 MMHG | DIASTOLIC BLOOD PRESSURE: 66 MMHG | WEIGHT: 171 LBS | HEIGHT: 76 IN | HEART RATE: 67 BPM | BODY MASS INDEX: 20.82 KG/M2

## 2024-05-20 DIAGNOSIS — N18.32 STAGE 3B CHRONIC KIDNEY DISEASE (MULTI): ICD-10-CM

## 2024-05-20 DIAGNOSIS — R97.20 ELEVATED PSA: ICD-10-CM

## 2024-05-20 DIAGNOSIS — N18.32 HYPERTENSIVE KIDNEY DISEASE WITH STAGE 3B CHRONIC KIDNEY DISEASE (MULTI): Primary | ICD-10-CM

## 2024-05-20 DIAGNOSIS — I12.9 HYPERTENSIVE KIDNEY DISEASE WITH STAGE 3B CHRONIC KIDNEY DISEASE (MULTI): Primary | ICD-10-CM

## 2024-05-20 LAB
ALBUMIN SERPL BCP-MCNC: 4.2 G/DL (ref 3.4–5)
ANION GAP SERPL CALC-SCNC: 12 MMOL/L (ref 10–20)
APPEARANCE UR: CLEAR
BILIRUB UR STRIP.AUTO-MCNC: NEGATIVE MG/DL
BUN SERPL-MCNC: 15 MG/DL (ref 6–23)
CALCIUM SERPL-MCNC: 9.3 MG/DL (ref 8.6–10.3)
CHLORIDE SERPL-SCNC: 106 MMOL/L (ref 98–107)
CO2 SERPL-SCNC: 25 MMOL/L (ref 21–32)
COLOR UR: YELLOW
CREAT SERPL-MCNC: 1.66 MG/DL (ref 0.5–1.3)
CREAT UR-MCNC: 221.6 MG/DL (ref 20–370)
CREAT UR-MCNC: 231.1 MG/DL (ref 20–370)
EGFRCR SERPLBLD CKD-EPI 2021: 45 ML/MIN/1.73M*2
ERYTHROCYTE [DISTWIDTH] IN BLOOD BY AUTOMATED COUNT: 13.2 % (ref 11.5–14.5)
GLUCOSE SERPL-MCNC: 68 MG/DL (ref 74–99)
GLUCOSE UR STRIP.AUTO-MCNC: ABNORMAL MG/DL
HCT VFR BLD AUTO: 47.2 % (ref 41–52)
HGB BLD-MCNC: 16.5 G/DL (ref 13.5–17.5)
KETONES UR STRIP.AUTO-MCNC: NEGATIVE MG/DL
LEUKOCYTE ESTERASE UR QL STRIP.AUTO: ABNORMAL
MCH RBC QN AUTO: 33.7 PG (ref 26–34)
MCHC RBC AUTO-ENTMCNC: 35 G/DL (ref 32–36)
MCV RBC AUTO: 97 FL (ref 80–100)
MICROALBUMIN UR-MCNC: 21.7 MG/L
MICROALBUMIN/CREAT UR: 9.4 UG/MG CREAT
MUCOUS THREADS #/AREA URNS AUTO: ABNORMAL /LPF
NITRITE UR QL STRIP.AUTO: NEGATIVE
NRBC BLD-RTO: 0 /100 WBCS (ref 0–0)
PH UR STRIP.AUTO: 6 [PH]
PHOSPHATE SERPL-MCNC: 3.9 MG/DL (ref 2.5–4.9)
PLATELET # BLD AUTO: 347 X10*3/UL (ref 150–450)
POTASSIUM SERPL-SCNC: 4.5 MMOL/L (ref 3.5–5.3)
PROT UR STRIP.AUTO-MCNC: ABNORMAL MG/DL
PROT UR-ACNC: 34 MG/DL (ref 5–25)
PROT/CREAT UR: 0.15 MG/MG CREAT (ref 0–0.17)
PSA SERPL-MCNC: 9.89 NG/ML
RBC # BLD AUTO: 4.89 X10*6/UL (ref 4.5–5.9)
RBC # UR STRIP.AUTO: ABNORMAL /UL
RBC #/AREA URNS AUTO: ABNORMAL /HPF
SODIUM SERPL-SCNC: 138 MMOL/L (ref 136–145)
SP GR UR STRIP.AUTO: 1.02
SQUAMOUS #/AREA URNS AUTO: ABNORMAL /HPF
UROBILINOGEN UR STRIP.AUTO-MCNC: NORMAL MG/DL
WBC # BLD AUTO: 4.7 X10*3/UL (ref 4.4–11.3)
WBC #/AREA URNS AUTO: ABNORMAL /HPF

## 2024-05-20 PROCEDURE — 84156 ASSAY OF PROTEIN URINE: CPT

## 2024-05-20 PROCEDURE — 80069 RENAL FUNCTION PANEL: CPT

## 2024-05-20 PROCEDURE — 4004F PT TOBACCO SCREEN RCVD TLK: CPT | Performed by: INTERNAL MEDICINE

## 2024-05-20 PROCEDURE — 82570 ASSAY OF URINE CREATININE: CPT

## 2024-05-20 PROCEDURE — 1160F RVW MEDS BY RX/DR IN RCRD: CPT | Performed by: INTERNAL MEDICINE

## 2024-05-20 PROCEDURE — 1159F MED LIST DOCD IN RCRD: CPT | Performed by: INTERNAL MEDICINE

## 2024-05-20 PROCEDURE — 3074F SYST BP LT 130 MM HG: CPT | Performed by: INTERNAL MEDICINE

## 2024-05-20 PROCEDURE — 3078F DIAST BP <80 MM HG: CPT | Performed by: INTERNAL MEDICINE

## 2024-05-20 PROCEDURE — 85027 COMPLETE CBC AUTOMATED: CPT

## 2024-05-20 PROCEDURE — 82043 UR ALBUMIN QUANTITATIVE: CPT

## 2024-05-20 PROCEDURE — 84153 ASSAY OF PSA TOTAL: CPT

## 2024-05-20 PROCEDURE — 36415 COLL VENOUS BLD VENIPUNCTURE: CPT

## 2024-05-20 PROCEDURE — 99213 OFFICE O/P EST LOW 20 MIN: CPT | Performed by: INTERNAL MEDICINE

## 2024-05-20 PROCEDURE — 81001 URINALYSIS AUTO W/SCOPE: CPT

## 2024-05-20 RX ORDER — DAPAGLIFLOZIN 10 MG/1
10 TABLET, FILM COATED ORAL DAILY
Qty: 90 TABLET | Refills: 3 | Status: SHIPPED | OUTPATIENT
Start: 2024-05-20 | End: 2025-05-20

## 2024-05-20 NOTE — PROGRESS NOTES
Chief Complaint: Follow up CKD    No specific complaints  Denies nausea, vomiting, no change to chest pain (relieved with tums--longstanding), dyspnea  No urinary symptoms    NAD  Sclera AI s inj  MMM, no sores  Deferred secondary to COVID  No edema  No tremor  No rash    CKD stage 3b  HTN well controlled  Proteinuria none significant  Tob abuse    Tob cessation counseling--patient honest, states he's not interested in stopping  Increase farxiga to 10 mg daily  Discussed specifically eating more than 35 g carbs per day--discussed risks of not taking in enough carbs; discussed eating fruit three times a day; on review of diet patient is eating halos 2-3 per day with toast  Labs and follow up in 3 months

## 2024-05-21 ENCOUNTER — TELEMEDICINE (OUTPATIENT)
Dept: PHARMACY | Facility: HOSPITAL | Age: 69
End: 2024-05-21
Payer: COMMERCIAL

## 2024-05-21 DIAGNOSIS — N18.32 STAGE 3B CHRONIC KIDNEY DISEASE (MULTI): Primary | ICD-10-CM

## 2024-05-21 NOTE — PROGRESS NOTES
"Subjective   Patient ID: Kamron Grimm is a 68 y.o. male who presents for No chief complaint on file..    Referring Provider: Rosa MARES  HPI: CKD stage 3a/a1 last EGFR 45 sCr 1.66  Farxiga increased to 10mg yesterday. Tolerating medication well. Bp improved in office and at home.     HTN: elevated at last ov.   /66 at last ov  Medications  Spironolactone 25mg every day  Metoprolol 25mg every day  Isosorbide 60mg every day  Amlodipine 5mg every day  Losartan 100mg every day    Glucose: well controled and monitored    HLD:  LDL 42  On statin? Atorva 80      Medications reviewed for appropriate dosing in setting of CKD  Recommended changes:  -no adjustments needed at this time    Objective     There were no vitals taken for this visit.     Labs  Lab Results   Component Value Date    BILITOT 0.9 07/23/2023    CALCIUM 9.2 01/17/2024    CO2 27 01/17/2024     01/17/2024    CREATININE 1.60 (H) 01/17/2024    GLUCOSE 65 (L) 01/17/2024    ALKPHOS 59 07/23/2023    K 5.0 01/17/2024    PROT 5.9 (L) 07/23/2023     01/17/2024    AST 12 07/23/2023    ALT 6 (L) 07/23/2023    BUN 16 01/17/2024    ANIONGAP 12 01/17/2024    MG 1.80 07/22/2023    PHOS 3.2 01/17/2024    ALBUMIN 3.8 01/17/2024    GFRMALE 36 (A) 09/12/2023     Lab Results   Component Value Date    TRIG 154 (H) 06/15/2023    CHOL 121 06/15/2023    HDL 48.4 06/15/2023     No results found for: \"HGBA1C\"    Current Outpatient Medications on File Prior to Visit   Medication Sig Dispense Refill    alfuzosin (Uroxatral) 10 mg 24 hr tablet Take 1 tablet (10 mg) by mouth once daily.      amLODIPine (Norvasc) 5 mg tablet Take 1 tablet (5 mg) by mouth once daily. 90 tablet 1    aspirin 81 mg EC tablet Take 1 tablet (81 mg) by mouth once daily.      atorvastatin (Lipitor) 80 mg tablet Take 1 tablet (80 mg) by mouth once daily. 90 tablet 1    chlorthalidone (Hygroton) 25 mg tablet Take by mouth once daily.      cholecalciferol (Vitamin D-3) 25 MCG (1000 " UT) tablet Take 1 tablet (25 mcg) by mouth once daily.      clopidogrel (Plavix) 75 mg tablet Take 1 tablet (75 mg) by mouth once daily. 90 tablet 1    EPINEPHrine 0.3 mg/0.3 mL injection syringe Inject 0.3 mL (0.3 mg) into the muscle.      esomeprazole (NexIUM) 40 mg DR capsule Take 1 capsule (40 mg) by mouth.      Farxiga 5 mg Take 1 tablet (5 mg) by mouth once daily. TAKE ONE TABLET BY MOUTH ONCE DAILY IN THE MORNING 30 tablet 1    isosorbide mononitrate ER (Imdur) 60 mg 24 hr tablet Take 1 tablet (60 mg) by mouth once daily. Do not crush or chew. 90 tablet 3    losartan (Cozaar) 100 mg tablet Take 1 tablet (100 mg) by mouth once daily.      metoprolol succinate XL (Toprol-XL) 25 mg 24 hr tablet Take 1 tablet (25 mg) by mouth once daily. 90 tablet 2    metoprolol succinate XL (Toprol-XL) 25 mg 24 hr tablet Take 1 tablet (25 mg) by mouth once daily. Do not crush or chew.      nicotine polacrilex (Nicorette) 4 mg gum Chew 1 each (4 mg) if needed for smoking cessation. Chew once piece slowly and intermittently for 30 minutes.  Repeat every 1-2 hours maximum of 24 pieces a day.      spironolactone (Aldactone) 25 mg tablet Take 1 tablet (25 mg) by mouth once daily. 90 tablet 3    tamsulosin (Flomax) 0.4 mg 24 hr capsule Take 2 capsules (0.8 mg) by mouth once daily. 180 capsule 3    zoster vaccine-recombinant adjuvanted (Shingrix, PF,) 50 mcg/0.5 mL vaccine Inject into the muscle.       No current facility-administered medications on file prior to visit.        Assessment/Plan   PATIENT EDUCATION/DISCUSSION:  - Counseled patient on MOA, expectations, side effects, duration of therapy, contraindications, administration, and monitoring parameters  - Answered all patient questions and concerns  - dual enrolled $0 for 1month farxiga    _______________________________________________________________________  PLAN  1. CONTINUE farxiga 10mg rx sent to [facility] in [facility]  Miguel PrettyD    Continue all meds under the  continuation of care with the referring provider and clinical pharmacy team.

## 2024-06-05 ENCOUNTER — OFFICE VISIT (OUTPATIENT)
Dept: PRIMARY CARE | Facility: CLINIC | Age: 69
End: 2024-06-05
Payer: COMMERCIAL

## 2024-06-05 VITALS
HEIGHT: 76 IN | SYSTOLIC BLOOD PRESSURE: 120 MMHG | BODY MASS INDEX: 20.81 KG/M2 | DIASTOLIC BLOOD PRESSURE: 74 MMHG | TEMPERATURE: 97.3 F | HEART RATE: 64 BPM

## 2024-06-05 DIAGNOSIS — R07.9 CHEST PAIN, UNSPECIFIED TYPE: ICD-10-CM

## 2024-06-05 DIAGNOSIS — Z00.00 HEALTHCARE MAINTENANCE: ICD-10-CM

## 2024-06-05 DIAGNOSIS — I25.10 CORONARY ARTERY DISEASE DUE TO LIPID RICH PLAQUE: Primary | ICD-10-CM

## 2024-06-05 DIAGNOSIS — T78.3XXS ANGIOEDEMA, SEQUELA: ICD-10-CM

## 2024-06-05 DIAGNOSIS — I25.83 CORONARY ARTERY DISEASE DUE TO LIPID RICH PLAQUE: Primary | ICD-10-CM

## 2024-06-05 DIAGNOSIS — F17.200 SMOKING: ICD-10-CM

## 2024-06-05 PROBLEM — I42.2 PRIMARY HYPERTROPHIC CARDIOMYOPATHY (MULTI): Status: RESOLVED | Noted: 2023-12-29 | Resolved: 2024-06-05

## 2024-06-05 PROBLEM — F19.21 HISTORY OF SUBSTANCE DEPENDENCE (MULTI): Status: RESOLVED | Noted: 2024-04-16 | Resolved: 2024-06-05

## 2024-06-05 LAB
ALBUMIN SERPL BCP-MCNC: 4.4 G/DL (ref 3.4–5)
ALP SERPL-CCNC: 106 U/L (ref 33–136)
ALT SERPL W P-5'-P-CCNC: 11 U/L (ref 10–52)
AST SERPL W P-5'-P-CCNC: 18 U/L (ref 9–39)
BILIRUB DIRECT SERPL-MCNC: 0.2 MG/DL (ref 0–0.3)
BILIRUB SERPL-MCNC: 0.8 MG/DL (ref 0–1.2)
CHOLEST SERPL-MCNC: 133 MG/DL (ref 0–199)
CHOLESTEROL/HDL RATIO: 2.9
HDLC SERPL-MCNC: 46.1 MG/DL
LDLC SERPL CALC-MCNC: 54 MG/DL
NON HDL CHOLESTEROL: 87 MG/DL (ref 0–149)
PROT SERPL-MCNC: 7.1 G/DL (ref 6.4–8.2)
TRIGL SERPL-MCNC: 167 MG/DL (ref 0–149)
TSH SERPL-ACNC: 1.33 MIU/L (ref 0.44–3.98)
VLDL: 33 MG/DL (ref 0–40)

## 2024-06-05 PROCEDURE — 80076 HEPATIC FUNCTION PANEL: CPT

## 2024-06-05 PROCEDURE — 84443 ASSAY THYROID STIM HORMONE: CPT

## 2024-06-05 PROCEDURE — 1124F ACP DISCUSS-NO DSCNMKR DOCD: CPT | Performed by: FAMILY MEDICINE

## 2024-06-05 PROCEDURE — 36415 COLL VENOUS BLD VENIPUNCTURE: CPT

## 2024-06-05 PROCEDURE — 1159F MED LIST DOCD IN RCRD: CPT | Performed by: FAMILY MEDICINE

## 2024-06-05 PROCEDURE — 1170F FXNL STATUS ASSESSED: CPT | Performed by: FAMILY MEDICINE

## 2024-06-05 PROCEDURE — 3078F DIAST BP <80 MM HG: CPT | Performed by: FAMILY MEDICINE

## 2024-06-05 PROCEDURE — 3074F SYST BP LT 130 MM HG: CPT | Performed by: FAMILY MEDICINE

## 2024-06-05 PROCEDURE — 1123F ACP DISCUSS/DSCN MKR DOCD: CPT | Performed by: FAMILY MEDICINE

## 2024-06-05 PROCEDURE — 4004F PT TOBACCO SCREEN RCVD TLK: CPT | Performed by: FAMILY MEDICINE

## 2024-06-05 PROCEDURE — G0442 ANNUAL ALCOHOL SCREEN 15 MIN: HCPCS | Performed by: FAMILY MEDICINE

## 2024-06-05 PROCEDURE — 99397 PER PM REEVAL EST PAT 65+ YR: CPT | Performed by: FAMILY MEDICINE

## 2024-06-05 PROCEDURE — G0439 PPPS, SUBSEQ VISIT: HCPCS | Performed by: FAMILY MEDICINE

## 2024-06-05 PROCEDURE — 99406 BEHAV CHNG SMOKING 3-10 MIN: CPT | Performed by: FAMILY MEDICINE

## 2024-06-05 PROCEDURE — 99214 OFFICE O/P EST MOD 30 MIN: CPT | Performed by: FAMILY MEDICINE

## 2024-06-05 PROCEDURE — 80061 LIPID PANEL: CPT

## 2024-06-05 RX ORDER — PNEUMOCOCCAL 20-VALENT CONJUGATE VACCINE 2.2; 2.2; 2.2; 2.2; 2.2; 2.2; 2.2; 2.2; 2.2; 2.2; 2.2; 2.2; 2.2; 2.2; 2.2; 2.2; 4.4; 2.2; 2.2; 2.2 UG/.5ML; UG/.5ML; UG/.5ML; UG/.5ML; UG/.5ML; UG/.5ML; UG/.5ML; UG/.5ML; UG/.5ML; UG/.5ML; UG/.5ML; UG/.5ML; UG/.5ML; UG/.5ML; UG/.5ML; UG/.5ML; UG/.5ML; UG/.5ML; UG/.5ML; UG/.5ML
0.5 INJECTION, SUSPENSION INTRAMUSCULAR ONCE
Qty: 0.5 ML | Refills: 0 | Status: SHIPPED | OUTPATIENT
Start: 2024-06-05 | End: 2024-06-05

## 2024-06-05 RX ORDER — EPINEPHRINE 0.3 MG/.3ML
0.3 INJECTION SUBCUTANEOUS ONCE AS NEEDED
Qty: 2 EACH | Refills: 3 | Status: SHIPPED | OUTPATIENT
Start: 2024-06-05

## 2024-06-05 RX ORDER — OMEPRAZOLE 20 MG/1
40 TABLET, DELAYED RELEASE ORAL DAILY
Qty: 180 TABLET | Refills: 3 | COMMUNITY
Start: 2024-06-05 | End: 2024-06-05 | Stop reason: WASHOUT

## 2024-06-05 RX ORDER — NITROGLYCERIN 0.4 MG/1
0.4 TABLET SUBLINGUAL EVERY 5 MIN PRN
Qty: 100 TABLET | Refills: 11 | Status: SHIPPED | OUTPATIENT
Start: 2024-06-05 | End: 2025-06-05

## 2024-06-05 RX ORDER — DIPHENHYDRAMINE HCL 25 MG
4 CAPSULE ORAL AS NEEDED
Qty: 100 EACH | Refills: 0 | Status: SHIPPED | OUTPATIENT
Start: 2024-06-05 | End: 2024-07-05

## 2024-06-05 ASSESSMENT — ACTIVITIES OF DAILY LIVING (ADL)
DRESSING: INDEPENDENT
DOING_HOUSEWORK: INDEPENDENT
GROCERY_SHOPPING: INDEPENDENT
TAKING_MEDICATION: INDEPENDENT
BATHING: INDEPENDENT
MANAGING_FINANCES: INDEPENDENT

## 2024-06-05 ASSESSMENT — ENCOUNTER SYMPTOMS
CHEST TIGHTNESS: 1
DEPRESSION: 0
OCCASIONAL FEELINGS OF UNSTEADINESS: 0
LOSS OF SENSATION IN FEET: 0

## 2024-06-05 ASSESSMENT — PATIENT HEALTH QUESTIONNAIRE - PHQ9
1. LITTLE INTEREST OR PLEASURE IN DOING THINGS: NOT AT ALL
1. LITTLE INTEREST OR PLEASURE IN DOING THINGS: NOT AT ALL
2. FEELING DOWN, DEPRESSED OR HOPELESS: NOT AT ALL
2. FEELING DOWN, DEPRESSED OR HOPELESS: NOT AT ALL
SUM OF ALL RESPONSES TO PHQ9 QUESTIONS 1 AND 2: 0
SUM OF ALL RESPONSES TO PHQ9 QUESTIONS 1 AND 2: 0

## 2024-06-05 NOTE — PROGRESS NOTES
"Subjective   Patient ID: Kamron Grimm is a 68 y.o. male who presents for Medicare Annual Wellness Visit Initial.    HPI     Review of Systems   Respiratory:  Positive for chest tightness.    All other systems reviewed and are negative.      Objective   /74   Pulse 64   Temp 36.3 °C (97.3 °F)   Ht 1.93 m (6' 4\")   BMI 20.81 kg/m²     Physical Exam  Cardiovascular:      Rate and Rhythm: Normal rate.      Heart sounds: Normal heart sounds.   Pulmonary:      Breath sounds: Normal breath sounds.   Abdominal:      Palpations: Abdomen is soft.   Musculoskeletal:         General: Normal range of motion.   Skin:     General: Skin is warm.   Neurological:      General: No focal deficit present.      Mental Status: He is alert.   Psychiatric:         Mood and Affect: Mood normal.         Assessment/Plan     Patient is here for annual Medicare well visit, preventive exam, and also follow-up    Annual Medicare well visit    I educated him on advance directives and how to obtain the living will the duration was over 16 minutes    I also screened him for alcohol misuse screening took me over 5 minutes and he says he occasionally drinks and also depression screen was negative    I continue to  on smoking gave him strategies to quit smoking and also placed the prescription for nicotine gum most recent CAT scan of the lung was in March    Preventive exam no changes nothing significant was found    Follow-up on cardiovascular illness smoking    He was seen by vascular surgery for abdominal aneurysm his next appointment is due in 3 years    He is still continuing to complain recurrent chest pain and it could be due to acid reflux I almost gave him a PPI but since there is a drug interaction with Plavix I advised him to take 3 times a day and also to call cardiology    Also I prescribed EpiPen because 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

## 2024-06-05 NOTE — PATIENT INSTRUCTIONS
The chest pain that you are complaining could be coming from your heart or it can be coming from pinched nerve starting from the neck traveling down to your chest or it can be coming from your stomach like heartburn    I want you to call cardiology regarding the chest pain.  And also today I added the nitroglycerin tablet whenever you get the pain you can put a tablet under your tongue    Continue the baby aspirin and the clopidogrel          Also see the eye doc      See you in 1 month

## 2024-06-11 DIAGNOSIS — I25.118 CORONARY ARTERY DISEASE INVOLVING NATIVE HEART WITH OTHER FORM OF ANGINA PECTORIS, UNSPECIFIED VESSEL OR LESION TYPE (CMS-HCC): ICD-10-CM

## 2024-06-11 RX ORDER — CLOPIDOGREL BISULFATE 75 MG/1
75 TABLET ORAL DAILY
Qty: 90 TABLET | Refills: 1 | Status: SHIPPED | OUTPATIENT
Start: 2024-06-11

## 2024-07-01 ENCOUNTER — APPOINTMENT (OUTPATIENT)
Dept: PRIMARY CARE | Facility: CLINIC | Age: 69
End: 2024-07-01
Payer: COMMERCIAL

## 2024-07-01 VITALS
HEART RATE: 66 BPM | BODY MASS INDEX: 20.8 KG/M2 | TEMPERATURE: 97.4 F | SYSTOLIC BLOOD PRESSURE: 108 MMHG | HEIGHT: 76 IN | WEIGHT: 170.8 LBS | DIASTOLIC BLOOD PRESSURE: 68 MMHG

## 2024-07-01 DIAGNOSIS — R07.9 CHEST PAIN, UNSPECIFIED TYPE: ICD-10-CM

## 2024-07-01 DIAGNOSIS — I25.10 CORONARY ARTERY DISEASE DUE TO LIPID RICH PLAQUE: Primary | ICD-10-CM

## 2024-07-01 DIAGNOSIS — I25.83 CORONARY ARTERY DISEASE DUE TO LIPID RICH PLAQUE: Primary | ICD-10-CM

## 2024-07-01 DIAGNOSIS — N18.4 STAGE 4 CHRONIC KIDNEY DISEASE (MULTI): ICD-10-CM

## 2024-07-01 DIAGNOSIS — I10 HYPERTENSION, UNSPECIFIED TYPE: ICD-10-CM

## 2024-07-01 PROCEDURE — 99214 OFFICE O/P EST MOD 30 MIN: CPT | Performed by: FAMILY MEDICINE

## 2024-07-01 PROCEDURE — 4004F PT TOBACCO SCREEN RCVD TLK: CPT | Performed by: FAMILY MEDICINE

## 2024-07-01 PROCEDURE — 3074F SYST BP LT 130 MM HG: CPT | Performed by: FAMILY MEDICINE

## 2024-07-01 PROCEDURE — 3078F DIAST BP <80 MM HG: CPT | Performed by: FAMILY MEDICINE

## 2024-07-01 PROCEDURE — 1159F MED LIST DOCD IN RCRD: CPT | Performed by: FAMILY MEDICINE

## 2024-07-01 ASSESSMENT — ENCOUNTER SYMPTOMS
DEPRESSION: 0
LOSS OF SENSATION IN FEET: 0
OCCASIONAL FEELINGS OF UNSTEADINESS: 0

## 2024-07-01 NOTE — PROGRESS NOTES
"Subjective   Patient ID: Kamron Grimm is a 68 y.o. male who presents for Follow-up.    HPI     #Chest pain f/up  Still having chest pain. Occurs nearly every day, about once a week will happen 2-3 times in a day. TUMS do help with the pain. Has not noticed a pattern, but sometimes will happen when he has pizza or stuff with cheese. Says his diet has not changed much, only eats seafood (no chicken, turkey, beef). Occasionally happens on an empty stomach. Experiences midsternal chest burning. Denies nausea, vomiting, increased coughing, chest tightness, SOB.    Review of Systems  As noted above.    Objective   /68   Pulse 66   Temp 36.3 °C (97.4 °F)   Ht 1.93 m (6' 4\")   Wt 77.5 kg (170 lb 12.8 oz)   BMI 20.79 kg/m²     Physical Exam  Constitutional:       Appearance: Normal appearance. He is normal weight.   HENT:      Head: Normocephalic.   Eyes:      Extraocular Movements: Extraocular movements intact.   Pulmonary:      Effort: Pulmonary effort is normal.   Neurological:      General: No focal deficit present.      Mental Status: He is alert and oriented to person, place, and time. Mental status is at baseline.   Psychiatric:         Mood and Affect: Mood normal.         Behavior: Behavior normal.         Thought Content: Thought content normal.         Judgment: Judgment normal.       Assessment/Plan     Continue taking the TUMS to manage indigestion/heartburn and resulting chest pain.    Follow-up in 6 months, or sooner if needed.    Discussed patient with Dr. Piero Alvarenga, MS  MS3         " Pt resting comfortably in the bed, expresses no needs at this time. Comfort care orders in place.

## 2024-07-16 NOTE — PROGRESS NOTES
Subjective   Patient ID: Kamron Grimm is a 69 y.o. male      HPI  Patient presenting today for 6 month FUV. History of Elevated PSA. Family history of prostate cancer (maternal uncle) Negative biopsy 7/18/19, negative MRI 8/12/21. CAD (NSTEMI 3/24/20 treated with cardiac rehab) on Brilinta HTN. Chronic renal insufficiency followed by Dr. Walter     Today's visit; Patient has been doing well NTF decreased to 1 to 2x.  Occasional daytime frequency. Patient notes occasional slow flow. Denies hematuria, dysuria, flank pain, and bothersome frequency or urgency.   Patient is a current smoker.      Lab Results   Component Value Date    PSA 9.89 (H) 05/20/2024    PSA 6.23 (H) 09/27/2023    PSA 8.27 (H) 02/15/2023    PSA 8.39 (H) 06/24/2021 09/27/2023 Urine Culture Negative  09/12/2023 Urinalysis, microscopic: RBC 2/HPF    I personally reviewed Prostate MRI 10/31/2023  1. There is no evidence of clinically significant neoplasm.  2. BPH changes of the transition zone. Diffuse non nodular  hypointensities within the peripheral zone, without evidence of  focally restricted diffusion (PI-RADS 2).      PI-RADS 2 - Low (clinically significant cancer is unlikely to be  present).    Review of Systems  All other systems have been reviewed and are negative for complaint.    Objective   Physical Exam  General: Well developed, well nourished, alert and cooperative, appears in no acute distress  Eyes: Non-injected conjunctiva, sclera clear, no proptosis  Cardiac: Extremities are warm and well perfused. No edema, cyanosis or pallor.   Lungs: Breathing is easy, non-labored. Speaking in clear and complete sentences. Normal diaphragmatic movement.  MSK: Ambulatory with steady gait, unassisted  Neuro: alert and oriented to person, place and time  Psych: Demonstrates good judgement and reason, without hallucinations, abnormal affect or abnormal behaviors.  Skin: no obvious lesions, no rashes.    Assessment/Plan   There are no diagnoses  linked to this encounter.      (Elevated PSA)  Today we discussed PSA as a tool to screen gentleman for prostate cancer. We discussed that many factors can elevate the PSA, and when the PSA is elevated further testing is warranted.     Prostate MRI 10/31/2023 demonstrated at 80.2 g prostate, PI-RADS 2 lesion with no evidence of clinically significant malignancy       Continue Tamsulosin to 0.8 mg daily    Patient will follow up in 6 months, sooner if needed.      All questions and concerns were addressed. Patient verbalizes understanding and has no other questions at this time. You are able to have email access to your chart. You can sign into [facility] or add the Follow My Health silvana on your smart phone to review today's visit, laboratory work and imaging.      Vicky Joyner-- KAREEN MARIE  Office Phone: [phone]

## 2024-07-17 ENCOUNTER — APPOINTMENT (OUTPATIENT)
Dept: UROLOGY | Facility: CLINIC | Age: 69
End: 2024-07-17
Payer: COMMERCIAL

## 2024-07-17 DIAGNOSIS — N13.8 BPH WITH OBSTRUCTION/LOWER URINARY TRACT SYMPTOMS: ICD-10-CM

## 2024-07-17 DIAGNOSIS — R97.20 ELEVATED PSA: Primary | ICD-10-CM

## 2024-07-17 DIAGNOSIS — N40.1 BPH WITH OBSTRUCTION/LOWER URINARY TRACT SYMPTOMS: ICD-10-CM

## 2024-07-17 LAB
POC APPEARANCE, URINE: CLEAR
POC BILIRUBIN, URINE: NEGATIVE
POC BLOOD, URINE: NEGATIVE
POC COLOR, URINE: YELLOW
POC GLUCOSE, URINE: ABNORMAL MG/DL
POC KETONES, URINE: NEGATIVE MG/DL
POC LEUKOCYTES, URINE: NEGATIVE
POC NITRITE,URINE: NEGATIVE
POC PH, URINE: 6 PH
POC PROTEIN, URINE: ABNORMAL MG/DL
POC SPECIFIC GRAVITY, URINE: 1.02
POC UROBILINOGEN, URINE: 1 EU/DL

## 2024-07-17 PROCEDURE — 4004F PT TOBACCO SCREEN RCVD TLK: CPT | Performed by: NURSE PRACTITIONER

## 2024-07-17 PROCEDURE — 1159F MED LIST DOCD IN RCRD: CPT | Performed by: NURSE PRACTITIONER

## 2024-07-17 PROCEDURE — 99213 OFFICE O/P EST LOW 20 MIN: CPT | Performed by: NURSE PRACTITIONER

## 2024-07-17 PROCEDURE — 51798 US URINE CAPACITY MEASURE: CPT | Performed by: NURSE PRACTITIONER

## 2024-07-17 PROCEDURE — 1160F RVW MEDS BY RX/DR IN RCRD: CPT | Performed by: NURSE PRACTITIONER

## 2024-07-17 PROCEDURE — G2211 COMPLEX E/M VISIT ADD ON: HCPCS | Performed by: NURSE PRACTITIONER

## 2024-07-17 PROCEDURE — 81003 URINALYSIS AUTO W/O SCOPE: CPT | Performed by: NURSE PRACTITIONER

## 2024-07-17 RX ORDER — TAMSULOSIN HYDROCHLORIDE 0.4 MG/1
0.4 CAPSULE ORAL DAILY
Qty: 180 CAPSULE | Refills: 3 | Status: SHIPPED | OUTPATIENT
Start: 2024-07-17

## 2024-08-27 DIAGNOSIS — I25.118 CORONARY ARTERY DISEASE INVOLVING NATIVE HEART WITH OTHER FORM OF ANGINA PECTORIS, UNSPECIFIED VESSEL OR LESION TYPE (CMS-HCC): ICD-10-CM

## 2024-08-27 RX ORDER — METOPROLOL SUCCINATE 25 MG/1
25 TABLET, EXTENDED RELEASE ORAL DAILY
Qty: 90 TABLET | Refills: 2 | Status: SHIPPED | OUTPATIENT
Start: 2024-08-27

## 2024-09-19 ENCOUNTER — APPOINTMENT (OUTPATIENT)
Dept: CARDIOLOGY | Facility: CLINIC | Age: 69
End: 2024-09-19
Payer: COMMERCIAL

## 2024-09-19 VITALS
BODY MASS INDEX: 20.7 KG/M2 | OXYGEN SATURATION: 95 % | HEIGHT: 76 IN | DIASTOLIC BLOOD PRESSURE: 71 MMHG | HEART RATE: 69 BPM | WEIGHT: 170 LBS | SYSTOLIC BLOOD PRESSURE: 120 MMHG

## 2024-09-19 DIAGNOSIS — I25.10 CORONARY ARTERY DISEASE INVOLVING NATIVE CORONARY ARTERY OF NATIVE HEART WITHOUT ANGINA PECTORIS: ICD-10-CM

## 2024-09-19 DIAGNOSIS — R42 DIZZINESS: Primary | ICD-10-CM

## 2024-09-19 DIAGNOSIS — I51.7 LVH (LEFT VENTRICULAR HYPERTROPHY): ICD-10-CM

## 2024-09-19 PROCEDURE — 3078F DIAST BP <80 MM HG: CPT | Performed by: INTERNAL MEDICINE

## 2024-09-19 PROCEDURE — 4004F PT TOBACCO SCREEN RCVD TLK: CPT | Performed by: INTERNAL MEDICINE

## 2024-09-19 PROCEDURE — 1159F MED LIST DOCD IN RCRD: CPT | Performed by: INTERNAL MEDICINE

## 2024-09-19 PROCEDURE — 99215 OFFICE O/P EST HI 40 MIN: CPT | Performed by: INTERNAL MEDICINE

## 2024-09-19 PROCEDURE — 3074F SYST BP LT 130 MM HG: CPT | Performed by: INTERNAL MEDICINE

## 2024-09-19 PROCEDURE — 3008F BODY MASS INDEX DOCD: CPT | Performed by: INTERNAL MEDICINE

## 2024-09-19 NOTE — PROGRESS NOTES
"    Heart Failure Cardiology    Kamron Grimm is a 69 y.o. male from [place], [place]. Works as a . Lives with his sister.     Today he reports to me feeling well.  He is physically active and continues working as a  all over the city.  His primary concern is intermittent episodes of dizziness, especially when going from bending down to standing.    Exam: /71 (BP Location: Right arm, Patient Position: Sitting, BP Cuff Size: Adult)   Pulse 69   Ht 1.93 m (6' 4\")   Wt 77.1 kg (170 lb)   SpO2 95%   BMI 20.69 kg/m²   No JVD  RRR, no murmur at rest or with valsalva  No LE edema    Current Outpatient Medications   Medication Instructions    alfuzosin (UROXATRAL) 10 mg, oral, Daily RT    amLODIPine (NORVASC) 5 mg, oral, Daily    aspirin 81 mg EC tablet 1 tablet, oral, Daily    atorvastatin (LIPITOR) 80 mg, oral, Daily    cholecalciferol (Vitamin D-3) 25 MCG (1000 UT) tablet 1 tablet, oral, Daily    clopidogrel (PLAVIX) 75 mg, oral, Daily    dapagliflozin propanediol (FARXIGA) 10 mg, oral, Daily    EPINEPHrine (EPIPEN) 0.3 mg, intramuscular, Once as needed    isosorbide mononitrate ER (IMDUR) 60 mg, oral, Daily, Do not crush or chew.    losartan (COZAAR) 100 mg, oral, Daily    metoprolol succinate XL (TOPROL-XL) 25 mg, oral, Daily    nicotine polacrilex (NICORETTE) 4 mg, Mouth/Throat, As needed, Chew once piece slowly and intermittently for 30 minutes.  Repeat every 1-2 hours maximum of 24 pieces a day.    nicotine polacrilex (NICORETTE) 4 mg, Mouth/Throat, As needed    nitroglycerin (NITROSTAT) 0.4 mg, sublingual, Every 5 min PRN, May repeat every 5 minutes for up to 3 doses.    spironolactone (ALDACTONE) 25 mg, oral, Daily    tamsulosin (FLOMAX) 0.4 mg, oral, Daily    zoster vaccine-recombinant adjuvanted (Shingrix, PF,) 50 mcg/0.5 mL vaccine intramuscular     Past medical history (non-cardiac):  -- Hypertension  -- Cigarette smoker  -- Hyperlipidemia  -- Enlarged prostate  -- Right renal artery " occlusion  -- CKD Stage 3a, UACR A1 (9.4 ug/mg)    Cardiovascular history:  -- History of NSTEMI (evidence of focal infarction in RCA territory on cMRI)  -- Non-obstructive multi-vessel CAD  -- Focal asymmetric septal hypertrophic (1.8 cm) with mild septal fibrosis, no evidence of LVOT obstruction, LVEF 71% (cMRI 7/2022)    Assessment: 69 y.o. AAM with LV thickening (hypertrophic cardiomyopathy versus hypertensive heart disease) and prior history of CAD. Doing well other than some dizziness.    Plan:  -- One potential contributor to dizziness may be Flomax.  He was prescribed for him last year, but he tells me today that he has no urinary retention at all.  I suggested he should try stopping it for a while and see if this helps relieve the dizziness.    Altaf Evangelista MD, MPH  Advanced Heart Failure and Transplant Cardiology  [facility]  [facility]

## 2024-09-19 NOTE — PATIENT INSTRUCTIONS
To reach Dr. Evangelista's office please call [phone] ([facility]). Fax [phone]. Call [phone] to schedule an appointment. You may also contact the HF RNs at [email or web address]    STOP Flomax   2. Please schedule a follow up with Dr. Evangelista in 1 year

## 2024-09-25 DIAGNOSIS — I10 HYPERTENSION, UNSPECIFIED TYPE: ICD-10-CM

## 2024-09-25 RX ORDER — AMLODIPINE BESYLATE 5 MG/1
5 TABLET ORAL DAILY
Qty: 30 TABLET | Refills: 1 | Status: SHIPPED | OUTPATIENT
Start: 2024-09-25 | End: 2024-09-27 | Stop reason: SDUPTHER

## 2024-09-27 DIAGNOSIS — I10 HYPERTENSION, UNSPECIFIED TYPE: ICD-10-CM

## 2024-09-27 RX ORDER — AMLODIPINE BESYLATE 5 MG/1
5 TABLET ORAL DAILY
Qty: 90 TABLET | Refills: 3 | Status: SHIPPED | OUTPATIENT
Start: 2024-09-27 | End: 2025-09-27

## 2024-10-08 ENCOUNTER — LAB (OUTPATIENT)
Dept: LAB | Facility: LAB | Age: 69
End: 2024-10-08
Payer: COMMERCIAL

## 2024-10-08 ENCOUNTER — APPOINTMENT (OUTPATIENT)
Dept: NEPHROLOGY | Facility: CLINIC | Age: 69
End: 2024-10-08
Payer: COMMERCIAL

## 2024-10-08 VITALS
HEART RATE: 51 BPM | SYSTOLIC BLOOD PRESSURE: 115 MMHG | DIASTOLIC BLOOD PRESSURE: 70 MMHG | WEIGHT: 169 LBS | BODY MASS INDEX: 20.57 KG/M2 | TEMPERATURE: 97.3 F

## 2024-10-08 DIAGNOSIS — N18.32 HYPERTENSIVE KIDNEY DISEASE WITH STAGE 3B CHRONIC KIDNEY DISEASE (MULTI): Primary | ICD-10-CM

## 2024-10-08 DIAGNOSIS — I12.9 HYPERTENSIVE KIDNEY DISEASE WITH STAGE 3B CHRONIC KIDNEY DISEASE (MULTI): Primary | ICD-10-CM

## 2024-10-08 DIAGNOSIS — I12.9 HYPERTENSIVE KIDNEY DISEASE WITH STAGE 3B CHRONIC KIDNEY DISEASE (MULTI): ICD-10-CM

## 2024-10-08 DIAGNOSIS — N18.32 HYPERTENSIVE KIDNEY DISEASE WITH STAGE 3B CHRONIC KIDNEY DISEASE (MULTI): ICD-10-CM

## 2024-10-08 LAB
25(OH)D3 SERPL-MCNC: 51 NG/ML (ref 30–100)
PTH-INTACT SERPL-MCNC: 34.2 PG/ML (ref 18.5–88)

## 2024-10-08 PROCEDURE — 82306 VITAMIN D 25 HYDROXY: CPT

## 2024-10-08 PROCEDURE — 3074F SYST BP LT 130 MM HG: CPT | Performed by: INTERNAL MEDICINE

## 2024-10-08 PROCEDURE — 3078F DIAST BP <80 MM HG: CPT | Performed by: INTERNAL MEDICINE

## 2024-10-08 PROCEDURE — 1159F MED LIST DOCD IN RCRD: CPT | Performed by: INTERNAL MEDICINE

## 2024-10-08 PROCEDURE — 4004F PT TOBACCO SCREEN RCVD TLK: CPT | Performed by: INTERNAL MEDICINE

## 2024-10-08 PROCEDURE — 1126F AMNT PAIN NOTED NONE PRSNT: CPT | Performed by: INTERNAL MEDICINE

## 2024-10-08 PROCEDURE — 83970 ASSAY OF PARATHORMONE: CPT

## 2024-10-08 PROCEDURE — 99213 OFFICE O/P EST LOW 20 MIN: CPT | Performed by: INTERNAL MEDICINE

## 2024-10-08 PROCEDURE — 1160F RVW MEDS BY RX/DR IN RCRD: CPT | Performed by: INTERNAL MEDICINE

## 2024-10-08 ASSESSMENT — PAIN SCALES - GENERAL: PAINLEVEL: 0-NO PAIN

## 2024-10-08 NOTE — PROGRESS NOTES
Chief Complaint: Follow up CKD    No specific complaints  Stopped taking tamsulosin secondary to lightheadedness  Denies nausea, vomiting, no change to chest pain (relieved with tums--longstanding), dyspnea  No urinary symptoms    NAD  Sclera AI s inj  MMM, no sores  Deferred secondary to COVID  No edema  No tremor  No rash    CKD stage 3b  HTN well controlled  Proteinuria none significant  Tob abuse    Tob cessation counseling--patient honest, states he's not interested in stopping  Discussed eating 35 grams carbs with [employer]  Labs and follow up in 3 months

## 2024-11-27 ENCOUNTER — APPOINTMENT (OUTPATIENT)
Dept: OPHTHALMOLOGY | Facility: CLINIC | Age: 69
End: 2024-11-27
Payer: COMMERCIAL

## 2024-12-04 ENCOUNTER — TELEPHONE (OUTPATIENT)
Dept: CARDIOLOGY | Facility: HOSPITAL | Age: 69
End: 2024-12-04
Payer: COMMERCIAL

## 2024-12-04 ENCOUNTER — TELEPHONE (OUTPATIENT)
Dept: CARDIOLOGY | Facility: CLINIC | Age: 69
End: 2024-12-04

## 2024-12-04 DIAGNOSIS — I25.10 CORONARY ARTERY DISEASE INVOLVING NATIVE CORONARY ARTERY OF NATIVE HEART WITHOUT ANGINA PECTORIS: Primary | ICD-10-CM

## 2024-12-04 DIAGNOSIS — I10 HYPERTENSION, UNSPECIFIED TYPE: ICD-10-CM

## 2024-12-04 DIAGNOSIS — I10 HYPERTENSION, UNSPECIFIED TYPE: Primary | ICD-10-CM

## 2024-12-04 RX ORDER — LOSARTAN POTASSIUM 100 MG/1
100 TABLET ORAL DAILY
Qty: 30 TABLET | Refills: 9 | Status: CANCELLED | OUTPATIENT
Start: 2024-12-04

## 2024-12-04 RX ORDER — LOSARTAN POTASSIUM 100 MG/1
100 TABLET ORAL DAILY
Qty: 30 TABLET | Refills: 11 | Status: CANCELLED | OUTPATIENT
Start: 2024-12-04 | End: 2025-12-04

## 2024-12-04 NOTE — TELEPHONE ENCOUNTER
Copied from CRM #[id number]. Topic: Information Request - Prescription Refill FAQ  >> Dec 4, 2024 10:02 AM Coleen ANDRES wrote:  Patient need refills of losartan (Cozaar) 100 mg tablet sent to [id number] - [facility] [place], [place] - [address] AT [facility]  Patient is completely out of medication

## 2024-12-05 DIAGNOSIS — I10 HYPERTENSION, UNSPECIFIED TYPE: ICD-10-CM

## 2024-12-05 DIAGNOSIS — I10 BENIGN ESSENTIAL HYPERTENSION: Primary | ICD-10-CM

## 2024-12-05 RX ORDER — LOSARTAN POTASSIUM 100 MG/1
100 TABLET ORAL DAILY
Qty: 90 TABLET | Refills: 3 | Status: SHIPPED | OUTPATIENT
Start: 2024-12-05 | End: 2025-12-05

## 2025-01-06 ENCOUNTER — APPOINTMENT (OUTPATIENT)
Dept: PRIMARY CARE | Facility: CLINIC | Age: 70
End: 2025-01-06
Payer: COMMERCIAL

## 2025-01-06 VITALS
HEART RATE: 63 BPM | SYSTOLIC BLOOD PRESSURE: 107 MMHG | HEIGHT: 76 IN | DIASTOLIC BLOOD PRESSURE: 69 MMHG | OXYGEN SATURATION: 98 % | BODY MASS INDEX: 20.09 KG/M2 | WEIGHT: 165 LBS

## 2025-01-06 DIAGNOSIS — I25.83 CORONARY ARTERY DISEASE DUE TO LIPID RICH PLAQUE: ICD-10-CM

## 2025-01-06 DIAGNOSIS — F17.210 SMOKING GREATER THAN 20 PACK YEARS: ICD-10-CM

## 2025-01-06 DIAGNOSIS — I25.10 CORONARY ARTERY DISEASE DUE TO LIPID RICH PLAQUE: ICD-10-CM

## 2025-01-06 DIAGNOSIS — I10 HYPERTENSION, UNSPECIFIED TYPE: Primary | ICD-10-CM

## 2025-01-06 DIAGNOSIS — N18.32 CHRONIC RENAL FAILURE (CRF), STAGE 3B (MULTI): ICD-10-CM

## 2025-01-06 DIAGNOSIS — F17.200 SMOKING: ICD-10-CM

## 2025-01-06 PROCEDURE — 99214 OFFICE O/P EST MOD 30 MIN: CPT | Performed by: FAMILY MEDICINE

## 2025-01-06 PROCEDURE — 3078F DIAST BP <80 MM HG: CPT | Performed by: FAMILY MEDICINE

## 2025-01-06 PROCEDURE — 99406 BEHAV CHNG SMOKING 3-10 MIN: CPT | Performed by: FAMILY MEDICINE

## 2025-01-06 PROCEDURE — 3008F BODY MASS INDEX DOCD: CPT | Performed by: FAMILY MEDICINE

## 2025-01-06 PROCEDURE — 1159F MED LIST DOCD IN RCRD: CPT | Performed by: FAMILY MEDICINE

## 2025-01-06 PROCEDURE — 3074F SYST BP LT 130 MM HG: CPT | Performed by: FAMILY MEDICINE

## 2025-01-06 PROCEDURE — 1124F ACP DISCUSS-NO DSCNMKR DOCD: CPT | Performed by: FAMILY MEDICINE

## 2025-01-06 RX ORDER — DIPHENHYDRAMINE HCL 25 MG
4 CAPSULE ORAL AS NEEDED
Qty: 100 EACH | Refills: 0 | Status: SHIPPED | OUTPATIENT
Start: 2025-01-06 | End: 2025-02-05

## 2025-01-06 ASSESSMENT — PATIENT HEALTH QUESTIONNAIRE - PHQ9
1. LITTLE INTEREST OR PLEASURE IN DOING THINGS: NOT AT ALL
SUM OF ALL RESPONSES TO PHQ9 QUESTIONS 1 AND 2: 0
2. FEELING DOWN, DEPRESSED OR HOPELESS: NOT AT ALL

## 2025-01-06 ASSESSMENT — COLUMBIA-SUICIDE SEVERITY RATING SCALE - C-SSRS
2. HAVE YOU ACTUALLY HAD ANY THOUGHTS OF KILLING YOURSELF?: NO
6. HAVE YOU EVER DONE ANYTHING, STARTED TO DO ANYTHING, OR PREPARED TO DO ANYTHING TO END YOUR LIFE?: NO
1. IN THE PAST MONTH, HAVE YOU WISHED YOU WERE DEAD OR WISHED YOU COULD GO TO SLEEP AND NOT WAKE UP?: NO

## 2025-01-06 NOTE — PATIENT INSTRUCTIONS
I am concerned that you are still smoking and I will place a referral for you to get the CAT scan in April    But like I showed you if there is a lot of blockage on the blood vessels supplying the heart so the smoking is not going to help please start using your nicotine gum    Also the lab lipid panel showed triglyceride was high and you also noticed that you are losing weight the up your recent is your muscles are becoming extremely weak and thin    So what ever you are consuming the starch the sugar the protein is building up fat rather than muscle    Please go to the gym or do some regular strength training exercises even daily to preserve your muscles otherwise these muscles and the bones will start leaving you and then it will allow you to have frequent falls    Find out whether your insurance company has provided you with a program called [employer] that will allow you to go to a gym without paying membership    Please come up with a program that will involve all the major joints the shoulder the elbows stomach muscles hip knees you got to do 20 reps with resistance if possible 3 days a week  \  Next appointment is in June for your annual for

## 2025-01-06 NOTE — PROGRESS NOTES
This is a 69-year-old male patient with coronary artery disease chronic renal failure enlarged prostate chronic smoker is here for follow-up    I showed him the CAT scan results severe atherosclerosis and encouraged him to stop smoking    Most recent lipid panel showed high triglyceride and I feel wasting muscle mass and eating the same amount and kind of food will increase his triglyceride therefore he promised to do resistant exercises to build better muscles this will hopefully improve his lipid panel as well as his stamina    Talk to him about Silver sneakers he will look into that    I encouraged him to quit smoking provided counseling of strategies to quit smoking encouraged him to use nicotine gum as well as suggested smoking cessation class which she did not want to go through    New CAT scan was ordered to be done in April

## 2025-01-17 ENCOUNTER — APPOINTMENT (OUTPATIENT)
Dept: UROLOGY | Facility: CLINIC | Age: 70
End: 2025-01-17
Payer: COMMERCIAL

## 2025-01-17 VITALS — TEMPERATURE: 97.1 F

## 2025-01-17 DIAGNOSIS — R97.20 ELEVATED PSA: Primary | ICD-10-CM

## 2025-01-17 DIAGNOSIS — N13.8 BPH WITH OBSTRUCTION/LOWER URINARY TRACT SYMPTOMS: ICD-10-CM

## 2025-01-17 DIAGNOSIS — N40.1 BPH WITH OBSTRUCTION/LOWER URINARY TRACT SYMPTOMS: ICD-10-CM

## 2025-01-17 DIAGNOSIS — N20.0 KIDNEY STONES: ICD-10-CM

## 2025-01-17 PROCEDURE — 1160F RVW MEDS BY RX/DR IN RCRD: CPT | Performed by: NURSE PRACTITIONER

## 2025-01-17 PROCEDURE — 1159F MED LIST DOCD IN RCRD: CPT | Performed by: NURSE PRACTITIONER

## 2025-01-17 PROCEDURE — G2211 COMPLEX E/M VISIT ADD ON: HCPCS | Performed by: NURSE PRACTITIONER

## 2025-01-17 PROCEDURE — 99213 OFFICE O/P EST LOW 20 MIN: CPT | Performed by: NURSE PRACTITIONER

## 2025-01-17 PROCEDURE — 51798 US URINE CAPACITY MEASURE: CPT | Performed by: NURSE PRACTITIONER

## 2025-01-17 NOTE — PROGRESS NOTES
[facility]  Outpatient Clinic Note    Subjective   Kamron Grimm is a 69 y.o. male    History of Present Illness   Patient presenting today for 6 month FUV. History of Elevated PSA. Family history of prostate cancer (maternal uncle) Negative biopsy 7/18/19, negative MRI 8/12/21. CAD (NSTEMI 3/24/20 treated with cardiac rehab) on Brilinta HTN.   Chronic renal insufficiency followed by Dr. Walter     Today's visit; Patient has been doing well NTF decreased to 1 to 2x.  Occasional daytime frequency. Patient notes occasional slow flow. Denies hematuria, dysuria, flank pain, and bothersome frequency or urgency.   Patient is a current smoker.     UA unable to leave urine sample   PVR 46 ml     Lab Results   Component Value Date    PSA 9.89 (H) 05/20/2024    PSA 6.23 (H) 09/27/2023    PSA 8.27 (H) 02/15/2023    PSA 8.39 (H) 06/24/2021       Past Medical History and Surgical History   Past Medical History:   Diagnosis Date    Encounter for general adult medical examination without abnormal findings 06/24/2022    Physical exam, annual    Encounter for general adult medical examination without abnormal findings 06/24/2021    Physical exam, annual    Encounter for screening for malignant neoplasm of colon 09/24/2020    Colon cancer screening    Other symptoms and signs involving the nervous system 11/08/2019    Suspected sleep apnea    Personal history of other endocrine, nutritional and metabolic disease 05/08/2019    History of hypokalemia     Past Surgical History:   Procedure Laterality Date    OTHER SURGICAL HISTORY  04/03/2019    Inguinal hernia repair laparoscopic    OTHER SURGICAL HISTORY  04/23/2019    Hernia repair       Medications  Current Outpatient Medications on File Prior to Visit   Medication Sig Dispense Refill    amLODIPine (Norvasc) 5 mg tablet Take 1 tablet (5 mg) by mouth once daily. 90 tablet 3    aspirin 81 mg EC tablet Take 1 tablet (81 mg) by mouth once daily.      atorvastatin (Lipitor) 80  mg tablet Take 1 tablet (80 mg) by mouth once daily. 90 tablet 1    cholecalciferol (Vitamin D-3) 25 MCG (1000 UT) tablet Take 1 tablet (25 mcg) by mouth once daily.      clopidogrel (Plavix) 75 mg tablet Take 1 tablet (75 mg) by mouth once daily. 90 tablet 1    dapagliflozin propanediol (Farxiga) 10 mg Take 1 tablet (10 mg) by mouth once daily. 90 tablet 3    EPINEPHrine 0.3 mg/0.3 mL injection syringe Inject 0.3 mL (0.3 mg) into the muscle 1 time if needed for anaphylaxis for up to 8 doses. 2 each 3    isosorbide mononitrate ER (Imdur) 60 mg 24 hr tablet Take 1 tablet (60 mg) by mouth once daily. Do not crush or chew. 90 tablet 3    losartan (Cozaar) 100 mg tablet Take 1 tablet (100 mg) by mouth once daily. 90 tablet 3    metoprolol succinate XL (Toprol-XL) 25 mg 24 hr tablet Take 1 tablet (25 mg) by mouth once daily. 90 tablet 2    nicotine polacrilex (Nicorette) 4 mg gum Chew 1 each (4 mg) if needed for smoking cessation. Chew once piece slowly and intermittently for 30 minutes.  Repeat every 1-2 hours maximum of 24 pieces a day.      nicotine polacrilex (Nicorette) 4 mg gum Chew 1 each (4 mg) if needed for smoking cessation. 100 each 0    nitroglycerin (Nitrostat) 0.4 mg SL tablet Place 1 tablet (0.4 mg) under the tongue every 5 minutes if needed for chest pain. May repeat every 5 minutes for up to 3 doses. 100 tablet 11    spironolactone (Aldactone) 25 mg tablet Take 1 tablet (25 mg) by mouth once daily. 90 tablet 3    zoster vaccine-recombinant adjuvanted (Shingrix, PF,) 50 mcg/0.5 mL vaccine Inject into the muscle.       No current facility-administered medications on file prior to visit.       Objective   Physicial Exam  General: Well developed, well nourished, alert and cooperative, appears in no acute distress  Eyes: Non-injected conjunctiva, sclera clear, no proptosis  Cardiac: Extremities are warm and well perfused. No edema, cyanosis or pallor.   Lungs: Breathing is easy, non-labored. Speaking in clear  and complete sentences. Normal diaphragmatic movement.  MSK: Ambulatory with steady gait, unassisted  Neuro: alert and oriented to person, place and time  Psych: Demonstrates good judgement and reason, without hallucinations, abnormal affect or abnormal behaviors.  Skin: no obvious lesions, no rashes.    No visits with results within 30 Day(s) from this visit.   Latest known visit with results is:   Lab on 10/08/2024   Component Date Value Ref Range Status    Parathyroid Hormone, Intact 10/08/2024 34.2  18.5 - 88.0 pg/mL Final    Vitamin D, 25-Hydroxy, Total 10/08/2024 51  30 - 100 ng/mL Final      Review of Systems  All other systems have been reviewed and are negative for complaint.      Assessment and Plan   (Elevated PSA)  Today we discussed PSA as a tool to screen gentleman for prostate cancer. We discussed that many factors can elevate the PSA, and when the PSA is elevated further testing is warranted.      Prostate MRI 10/31/2023 demonstrated at 80.2 g prostate, PI-RADS 2 lesion with no evidence of clinically significant malignancy     5/2024 PSA 9.89 ng/dl     Patient will obtain PSA now. I will call with results.   Consider repeat MRI if remains elevated.     Patient will follow up in 6 months, sooner if needed.     All questions and concerns were addressed. Patient verbalizes understanding and has no other questions at this time.     Vicky Joyner-- KAREEN MARIE  Office Phone:  [phone]

## 2025-01-30 DIAGNOSIS — I10 BENIGN ESSENTIAL HYPERTENSION: ICD-10-CM

## 2025-01-30 RX ORDER — ISOSORBIDE MONONITRATE 60 MG/1
60 TABLET, EXTENDED RELEASE ORAL DAILY
Qty: 90 TABLET | Refills: 2 | Status: SHIPPED | OUTPATIENT
Start: 2025-01-30

## 2025-02-11 ENCOUNTER — APPOINTMENT (OUTPATIENT)
Dept: NEPHROLOGY | Facility: CLINIC | Age: 70
End: 2025-02-11
Payer: COMMERCIAL

## 2025-02-11 VITALS
OXYGEN SATURATION: 98 % | SYSTOLIC BLOOD PRESSURE: 139 MMHG | TEMPERATURE: 97.1 F | DIASTOLIC BLOOD PRESSURE: 75 MMHG | HEART RATE: 72 BPM | WEIGHT: 166 LBS | BODY MASS INDEX: 20.21 KG/M2

## 2025-02-11 DIAGNOSIS — N18.32 STAGE 3B CHRONIC KIDNEY DISEASE (MULTI): Primary | ICD-10-CM

## 2025-02-11 LAB
ALBUMIN SERPL-MCNC: 4.5 G/DL (ref 3.6–5.1)
ALBUMIN/CREAT UR: 2 MG/G CREAT
APPEARANCE UR: ABNORMAL
BACTERIA #/AREA URNS HPF: ABNORMAL /HPF
BILIRUB UR QL STRIP: NEGATIVE
BUN SERPL-MCNC: 15 MG/DL (ref 7–25)
BUN/CREAT SERPL: 9 (CALC) (ref 6–22)
CALCIUM SERPL-MCNC: 10.2 MG/DL (ref 8.6–10.3)
CHLORIDE SERPL-SCNC: 106 MMOL/L (ref 98–110)
CO2 SERPL-SCNC: 25 MMOL/L (ref 20–32)
COLOR UR: ABNORMAL
CREAT SERPL-MCNC: 1.69 MG/DL (ref 0.7–1.35)
CREAT UR-MCNC: 159 MG/DL (ref 20–320)
CREAT UR-MCNC: 159 MG/DL (ref 20–320)
EGFRCR SERPLBLD CKD-EPI 2021: 44 ML/MIN/1.73M2
ERYTHROCYTE [DISTWIDTH] IN BLOOD BY AUTOMATED COUNT: 12.8 % (ref 11–15)
GLUCOSE SERPL-MCNC: 70 MG/DL (ref 65–99)
GLUCOSE UR QL STRIP: ABNORMAL
HCT VFR BLD AUTO: 47.1 % (ref 38.5–50)
HGB BLD-MCNC: 16 G/DL (ref 13.2–17.1)
HGB UR QL STRIP: NEGATIVE
HYALINE CASTS #/AREA URNS LPF: ABNORMAL /LPF
KETONES UR QL STRIP: ABNORMAL
LEUKOCYTE ESTERASE UR QL STRIP: ABNORMAL
MCH RBC QN AUTO: 34.3 PG (ref 27–33)
MCHC RBC AUTO-ENTMCNC: 34 G/DL (ref 32–36)
MCV RBC AUTO: 101.1 FL (ref 80–100)
MICROALBUMIN UR-MCNC: 0.3 MG/DL
NITRITE UR QL STRIP: NEGATIVE
PH UR STRIP: 6 [PH] (ref 5–8)
PHOSPHATE SERPL-MCNC: 4.6 MG/DL (ref 2.1–4.3)
PLATELET # BLD AUTO: 381 THOUSAND/UL (ref 140–400)
PMV BLD REES-ECKER: 9.7 FL (ref 7.5–12.5)
POTASSIUM SERPL-SCNC: 5.1 MMOL/L (ref 3.5–5.3)
PROT UR QL STRIP: ABNORMAL
PROT UR-MCNC: 24 MG/DL (ref 5–25)
PROT/CREAT UR: 0.15 MG/MG CREAT (ref 0.03–0.15)
PROT/CREAT UR: 151 MG/G CREAT (ref 25–148)
PSA SERPL-MCNC: 13.6 NG/ML
RBC # BLD AUTO: 4.66 MILLION/UL (ref 4.2–5.8)
RBC #/AREA URNS HPF: ABNORMAL /HPF
SERVICE CMNT-IMP: ABNORMAL
SODIUM SERPL-SCNC: 141 MMOL/L (ref 135–146)
SP GR UR STRIP: 1.02 (ref 1–1.03)
SQUAMOUS #/AREA URNS HPF: ABNORMAL /HPF
WBC # BLD AUTO: 5.1 THOUSAND/UL (ref 3.8–10.8)
WBC #/AREA URNS HPF: ABNORMAL /HPF

## 2025-02-11 PROCEDURE — 1126F AMNT PAIN NOTED NONE PRSNT: CPT | Performed by: INTERNAL MEDICINE

## 2025-02-11 PROCEDURE — 1159F MED LIST DOCD IN RCRD: CPT | Performed by: INTERNAL MEDICINE

## 2025-02-11 PROCEDURE — 3075F SYST BP GE 130 - 139MM HG: CPT | Performed by: INTERNAL MEDICINE

## 2025-02-11 PROCEDURE — 99213 OFFICE O/P EST LOW 20 MIN: CPT | Performed by: INTERNAL MEDICINE

## 2025-02-11 PROCEDURE — 3078F DIAST BP <80 MM HG: CPT | Performed by: INTERNAL MEDICINE

## 2025-02-11 ASSESSMENT — PAIN SCALES - GENERAL: PAINLEVEL_OUTOF10: 0-NO PAIN

## 2025-02-11 NOTE — PROGRESS NOTES
Chief Complaint: Follow up CKD    No specific complaints  Recovering from chest cold  Denies nausea, vomiting, no change to chest pain, dyspnea  No urinary symptoms  Not eating much    NAD  Sclera AI s inj  MMM, no sores  Deferred secondary to COVID  No edema  No tremor  No rash    CKD stage 3b  HTN well controlled in general slightly high today  Proteinuria none significant  Tob abuse no change; not interested in stopping    Asked patient to hold farxiga  Nutrition to talk to patient regarding dietary carb , eating regularly  Labs and follow up in 6 months

## 2025-02-13 ENCOUNTER — TELEMEDICINE (OUTPATIENT)
Dept: UROLOGY | Facility: CLINIC | Age: 70
End: 2025-02-13
Payer: COMMERCIAL

## 2025-02-13 DIAGNOSIS — R97.20 ELEVATED PSA: Primary | ICD-10-CM

## 2025-02-13 PROCEDURE — 1159F MED LIST DOCD IN RCRD: CPT | Performed by: NURSE PRACTITIONER

## 2025-02-13 PROCEDURE — 99213 OFFICE O/P EST LOW 20 MIN: CPT | Performed by: NURSE PRACTITIONER

## 2025-02-13 PROCEDURE — 1160F RVW MEDS BY RX/DR IN RCRD: CPT | Performed by: NURSE PRACTITIONER

## 2025-02-13 PROCEDURE — G2211 COMPLEX E/M VISIT ADD ON: HCPCS | Performed by: NURSE PRACTITIONER

## 2025-02-13 NOTE — PROGRESS NOTES
[facility]  Outpatient Clinic Note    Subjective   Kamron Grimm is a 69 y.o. male    History of Present Illness   Patient presenting today via telephone for FUV. History of Elevated PSA. Family history of prostate cancer (maternal uncle) Negative biopsy 7/18/19, negative MRI 8/12/21. CAD (NSTEMI 3/24/20 treated with cardiac rehab) on Brilinta HTN.   Chronic renal insufficiency followed by Dr. Walter     Today's visit; Patient has been doing well overall. Occasional daytime frequency. Patient notes occasional slow flow. Denies hematuria, dysuria, flank pain, and bothersome frequency or urgency.   Patient is a current smoker.   We discussed PSA results today     11/2/2023 Prostate MRI   IMPRESSION:  1. There is no evidence of clinically significant neoplasm.  2. BPH changes of the transition zone. Diffuse non nodular  hypointensities within the peripheral zone, without evidence of  focally restricted diffusion (PI-RADS 2).      PI-RADS 2 - Low (clinically significant cancer is unlikely to be  present).      Lab Results   Component Value Date    PSA 13.60 (H) 02/10/2025    PSA 9.89 (H) 05/20/2024    PSA 6.23 (H) 09/27/2023    PSA 8.27 (H) 02/15/2023    PSA 8.39 (H) 06/24/2021       Past Medical History and Surgical History   Past Medical History:   Diagnosis Date    Encounter for general adult medical examination without abnormal findings 06/24/2022    Physical exam, annual    Encounter for general adult medical examination without abnormal findings 06/24/2021    Physical exam, annual    Encounter for screening for malignant neoplasm of colon 09/24/2020    Colon cancer screening    Other symptoms and signs involving the nervous system 11/08/2019    Suspected sleep apnea    Personal history of other endocrine, nutritional and metabolic disease 05/08/2019    History of hypokalemia     Past Surgical History:   Procedure Laterality Date    OTHER SURGICAL HISTORY  04/03/2019    Inguinal hernia repair  laparoscopic    OTHER SURGICAL HISTORY  04/23/2019    Hernia repair       Medications  Current Outpatient Medications on File Prior to Visit   Medication Sig Dispense Refill    amLODIPine (Norvasc) 5 mg tablet Take 1 tablet (5 mg) by mouth once daily. 90 tablet 3    aspirin 81 mg EC tablet Take 1 tablet (81 mg) by mouth once daily.      atorvastatin (Lipitor) 80 mg tablet Take 1 tablet (80 mg) by mouth once daily. 90 tablet 1    cholecalciferol (Vitamin D-3) 25 MCG (1000 UT) tablet Take 1 tablet (25 mcg) by mouth once daily.      clopidogrel (Plavix) 75 mg tablet Take 1 tablet (75 mg) by mouth once daily. 90 tablet 1    dapagliflozin propanediol (Farxiga) 10 mg Take 1 tablet (10 mg) by mouth once daily. 90 tablet 3    EPINEPHrine 0.3 mg/0.3 mL injection syringe Inject 0.3 mL (0.3 mg) into the muscle 1 time if needed for anaphylaxis for up to 8 doses. 2 each 3    isosorbide mononitrate ER (Imdur) 60 mg 24 hr tablet Take 1 tablet (60 mg) by mouth once daily. 90 tablet 2    losartan (Cozaar) 100 mg tablet Take 1 tablet (100 mg) by mouth once daily. 90 tablet 3    metoprolol succinate XL (Toprol-XL) 25 mg 24 hr tablet Take 1 tablet (25 mg) by mouth once daily. 90 tablet 2    nicotine polacrilex (Nicorette) 4 mg gum Chew 1 each (4 mg) if needed for smoking cessation. Chew once piece slowly and intermittently for 30 minutes.  Repeat every 1-2 hours maximum of 24 pieces a day.      nicotine polacrilex (Nicorette) 4 mg gum Chew 1 each (4 mg) if needed for smoking cessation. 100 each 0    nitroglycerin (Nitrostat) 0.4 mg SL tablet Place 1 tablet (0.4 mg) under the tongue every 5 minutes if needed for chest pain. May repeat every 5 minutes for up to 3 doses. 100 tablet 11    spironolactone (Aldactone) 25 mg tablet Take 1 tablet (25 mg) by mouth once daily. 90 tablet 3    zoster vaccine-recombinant adjuvanted (Shingrix, PF,) 50 mcg/0.5 mL vaccine Inject into the muscle.       No current facility-administered medications on  file prior to visit.       Objective   Physicial Exam  General: Well developed, well nourished, alert and cooperative, appears in no acute distress  Eyes: Non-injected conjunctiva, sclera clear, no proptosis  Cardiac: Extremities are warm and well perfused. No edema, cyanosis or pallor.   Lungs: Breathing is easy, non-labored. Speaking in clear and complete sentences. Normal diaphragmatic movement.  MSK: Ambulatory with steady gait, unassisted  Neuro: alert and oriented to person, place and time  Psych: Demonstrates good judgement and reason, without hallucinations, abnormal affect or abnormal behaviors.  Skin: no obvious lesions, no rashes.    Orders Only on 02/10/2025   Component Date Value Ref Range Status    PSA, TOTAL 02/10/2025 13.60 (H)  < OR = 4.00 ng/mL Final    Comment: The total PSA value from this assay system is   standardized against the WHO standard. The test   result will be approximately 20% lower when compared   to the equimolar-standardized total PSA ([place]). Comparison of serial PSA results should be   interpreted with this fact in mind.     This test was performed using the Siemens   chemiluminescent method. Values obtained from   different assay methods cannot be used  interchangeably. PSA levels, regardless of  value, should not be interpreted as absolute  evidence of the presence or absence of disease.     Orders Only on 02/10/2025   Component Date Value Ref Range Status    GLUCOSE 02/10/2025 70  65 - 99 mg/dL Final    Comment:               Fasting reference interval         UREA NITROGEN (BUN) 02/10/2025 15  7 - 25 mg/dL Final    CREATININE 02/10/2025 1.69 (H)  0.70 - 1.35 mg/dL Final    EGFR 02/10/2025 44 (L)  > OR = 60 mL/min/1.73m2 Final    BUN/CREATININE RATIO 02/10/2025 9  6 - 22 (calc) Final    SODIUM 02/10/2025 141  135 - 146 mmol/L Final    POTASSIUM 02/10/2025 5.1  3.5 - 5.3 mmol/L Final    CHLORIDE 02/10/2025 106  98 - 110 mmol/L Final    CARBON DIOXIDE 02/10/2025 25   20 - 32 mmol/L Final    CALCIUM 02/10/2025 10.2  8.6 - 10.3 mg/dL Final    PHOSPHATE (AS PHOSPHORUS) 02/10/2025 4.6 (H)  2.1 - 4.3 mg/dL Final    ALBUMIN 02/10/2025 4.5  3.6 - 5.1 g/dL Final    CREATININE, RANDOM URINE 02/10/2025 159  20 - 320 mg/dL Final    ALBUMIN, URINE 02/10/2025 0.3  See Note: mg/dL Final    Comment: Reference Range:    Reference Range  Not established      ALBUMIN/CREATININE RATIO, RANDOM U* 02/10/2025 2  <30 mg/g creat Final    Comment:    The ADA defines abnormalities in albumin  excretion as follows:     Albuminuria Category        Result (mg/g creatinine)     Normal to Mildly increased   <30  Moderately increased            Severely increased           > OR = 300     The ADA recommends that at least two of three  specimens collected within a 3-6 month period be  abnormal before considering a patient to be  within a diagnostic category.      WHITE BLOOD CELL COUNT 02/10/2025 5.1  3.8 - 10.8 Thousand/uL Final    RED BLOOD CELL COUNT 02/10/2025 4.66  4.20 - 5.80 Million/uL Final    HEMOGLOBIN 02/10/2025 16.0  13.2 - 17.1 g/dL Final    HEMATOCRIT 02/10/2025 47.1  38.5 - 50.0 % Final    MCV 02/10/2025 101.1 (H)  80.0 - 100.0 fL Final    MCH 02/10/2025 34.3 (H)  27.0 - 33.0 pg Final    MCHC 02/10/2025 34.0  32.0 - 36.0 g/dL Final    Comment: For adults, a slight decrease in the calculated MCHC  value (in the range of 30 to 32 g/dL) is most likely  not clinically significant; however, it should be  interpreted with caution in correlation with other  red cell parameters and the patient's clinical  condition.      RDW 02/10/2025 12.8  11.0 - 15.0 % Final    PLATELET COUNT 02/10/2025 381  140 - 400 Thousand/uL Final    MPV 02/10/2025 9.7  7.5 - 12.5 fL Final    CREATININE, RANDOM URINE 02/10/2025 159  20 - 320 mg/dL Final    PROTEIN/CREATININE RATIO 02/10/2025 151 (H)  25 - 148 mg/g creat Final    PROTEIN/CREATININE RATIO 02/10/2025 0.151 (H)  0.025 - 0.148 mg/mg creat Final    PROTEIN, TOTAL,  RANDOM UR 02/10/2025 24  5 - 25 mg/dL Final    COLOR 02/10/2025 DARK YELLOW  YELLOW Final    APPEARANCE 02/10/2025 TURBID (A)  CLEAR Final    SPECIFIC GRAVITY 02/10/2025 1.020  1.001 - 1.035 Final    PH 02/10/2025 6.0  5.0 - 8.0 Final    GLUCOSE 02/10/2025 3+ (A)  NEGATIVE Final    BILIRUBIN 02/10/2025 NEGATIVE  NEGATIVE Final    KETONES 02/10/2025 TRACE (A)  NEGATIVE Final    OCCULT BLOOD 02/10/2025 NEGATIVE  NEGATIVE Final    PROTEIN 02/10/2025 TRACE (A)  NEGATIVE Final    NITRITE 02/10/2025 NEGATIVE  NEGATIVE Final    LEUKOCYTE ESTERASE 02/10/2025 2+ (A)  NEGATIVE Final    WBC 02/10/2025 20-40 (A)  < OR = 5 /HPF Final    RBC 02/10/2025 0-2  < OR = 2 /HPF Final    SQUAMOUS EPITHELIAL CELLS 02/10/2025 0-5  < OR = 5 /HPF Final    BACTERIA 02/10/2025 NONE SEEN  NONE SEEN /HPF Final    HYALINE CAST 02/10/2025 NONE SEEN  NONE SEEN /LPF Final    NOTE 02/10/2025    Final    Comment: This urine was analyzed for the presence of WBC,   RBC, bacteria, casts, and other formed elements.   Only those elements seen were reported.              Review of Systems  All other systems have been reviewed and are negative for complaint.      Assessment and Plan   (Elevated PSA)  Today we discussed PSA as a tool to screen gentleman for prostate cancer. We discussed that many factors can elevate the PSA, and when the PSA is elevated further testing is warranted.      Prostate MRI 10/31/2023 demonstrated at 80.2 g prostate, PI-RADS 2 lesion with no evidence of clinically significant malignancy     5/2024 PSA 9.89 ng/dl   2/10/2025 PSA 13.60    Patient will schedule Prostate MRI. I will call with results   Patient will follow up in 6 months, sooner if needed.     All questions and concerns were addressed. Patient verbalizes understanding and has no other questions at this time.     Vicky Joyner-- KAREEN MARIE  Office Phone:  [phone]

## 2025-02-18 ENCOUNTER — APPOINTMENT (OUTPATIENT)
Dept: NEPHROLOGY | Facility: CLINIC | Age: 70
End: 2025-02-18
Payer: COMMERCIAL

## 2025-03-24 ENCOUNTER — HOSPITAL ENCOUNTER (OUTPATIENT)
Dept: RADIOLOGY | Facility: HOSPITAL | Age: 70
Discharge: HOME | End: 2025-03-24
Payer: COMMERCIAL

## 2025-03-24 DIAGNOSIS — F17.210 SMOKING GREATER THAN 20 PACK YEARS: ICD-10-CM

## 2025-03-24 DIAGNOSIS — F17.200 SMOKING: ICD-10-CM

## 2025-03-24 PROCEDURE — 71271 CT THORAX LUNG CANCER SCR C-: CPT

## 2025-03-24 PROCEDURE — 71271 CT THORAX LUNG CANCER SCR C-: CPT | Performed by: RADIOLOGY

## 2025-04-03 NOTE — PROGRESS NOTES
Nutrition Initial Assessment:     Patient Kamron Grimm is a 69 y.o. male being seen at [facility] who was referred by     Rosa Walter MD    on 2/11/2025 for   1. Stage 3b chronic kidney disease (CKD) (Multi)            Nutrition Assessment    Patient Active Problem List   Diagnosis    Abdominal aortic aneurysm, without rupture, unspecified    Hypotension    Hematuria    Hyperlipidemia    Hyperopia of both eyes    Edema of lower extremity    Intermittent claudication (CMS-[facility])    Hypokalemia    NSTEMI, initial episode of care (Multi)    Myopia of both eyes with regular astigmatism    Vision blurring    Thrombocytosis    Syncope and collapse    Stage 3b chronic kidney disease (CKD) (Multi)    Smoking    Lung mass    Peripheral arterial disease (CMS-[facility])    Renal artery obstruction (Multi)    Dysphagia    Erectile dysfunction    Esophagitis, unspecified without bleeding    Inflammatory dermatosis    Stage 4 chronic kidney disease (Multi)    Chronic stable angina    Cataract, nuclear sclerotic, both eyes    BPH with elevated PSA    Benign essential hypertension    Arteriosclerosis of coronary artery    Alcohol abuse, continuous    Anemia    Angioedema    AAA (abdominal aortic aneurysm)    Hypertension    Chest pain    Erosive esophagitis    Right inguinal hernia       Nutrition History:  Food & Nutrition History:    Food Allergies:  ;  Food Intolerances:    Vitamin/mineral intake:      Herbal supplements:    Medication and Complementary/Alternative Medicine Use:    GI Symptoms:    Mouth Issues:  ; Teeth Issues:    Sleep Habits:      Diet Recall:  Meal 1:    Snack 1:    Meal 2:    Snack 2:    Meal 3:    Snack 3:    Food Variety:    Oral Nutrition Supplement Use:            Fluid Intake:    Energy Intake:      Food Preparation:  Cooking:    Grocery Shopping:    Dining Out:      Physical Activity:        Food Security/Insecurity:      Anthropometrics:                            Weight History:   Daily  Weight  02/11/25 : 75.3 kg (166 lb)  01/06/25 : 74.8 kg (165 lb)  10/08/24 : 76.7 kg (169 lb)  09/19/24 : 77.1 kg (170 lb)  07/01/24 : 77.5 kg (170 lb 12.8 oz)  05/20/24 : 77.6 kg (171 lb)  04/17/24 : 79.6 kg (175 lb 6.4 oz)  03/04/24 : 79 kg (174 lb 3.2 oz)  01/18/24 : 78.9 kg (174 lb)  12/14/23 : 80.3 kg (177 lb)    Weight Change %:       Nutrition Focused Physical Exam Findings:  Subcutaneous Fat Loss:        Muscle Wasting:       Physical Findings:  Hair:    Eyes:    Nails:    Skin:    Respiratory:    Edema:        Nutrition Significant Labs:  Last Chem:     Chemistry    Lab Results   Component Value Date/Time     02/10/2025 1221    K 5.1 02/10/2025 1221     02/10/2025 1221    CO2 25 02/10/2025 1221    BUN 15 02/10/2025 1221    CREATININE 1.69 (H) 02/10/2025 1221    Lab Results   Component Value Date/Time    CALCIUM 10.2 02/10/2025 1221    ALKPHOS 106 06/05/2024 0958    AST 18 06/05/2024 0958    ALT 11 06/05/2024 0958    BILITOT 0.8 06/05/2024 0958       , CMP Trend:    Recent Labs     02/10/25  1221 06/05/24  0958 05/20/24  1053 01/17/24  1035 01/17/24  1035 09/12/23  0919 07/23/23  0613 07/22/23  1201   GLUCOSE 70  --  68*  --  65* 75 79 74     --  138  --  140 138 134* 135*   K 5.1  --  4.5  --  5.0 4.3 4.2 4.3     --  106  --  106 106 107 107   CO2 25  --  25  --  27 26 20* 20*   ANIONGAP  --   --  12  --  12 10 11 12   BUN 15  --  15  --  16 16 25* 23   CREATININE 1.69*  --  1.66*  --  1.60* 2.00* 2.15* 2.49*   EGFR 44*  --  45*  --  47*  --   --   --    CALCIUM 10.2  --  9.3  --  9.2 8.7 8.4* 8.3*   ALBUMIN 4.5 4.4 4.2   < > 3.8 4.3 3.5 3.9   ALKPHOS  --  106  --   --   --   --  59 65   PROT  --  7.1  --   --   --   --  5.9* 6.2*   AST  --  18  --   --   --   --  12 13   BILITOT  --  0.8  --   --   --   --  0.9 0.7   ALT  --  11  --   --   --   --  6* 7*    < > = values in this interval not displayed.   , CBC Trend:   Recent Labs     02/10/25  1221 05/20/24  1053 01/17/24  1032  "09/12/23 0919   WBC 5.1 4.7 3.9* 5.0   NRBC  --  0.0 0.0 0.4   RBC 4.66 4.89 4.31* 4.00*   HGB 16.0 16.5 14.7 13.8   HCT 47.1 47.2 42.2 39.9*   .1* 97 98 100   MCH 34.3* 33.7 34.1*  --    MCHC 34.0 35.0 34.8 34.6   RDW 12.8 13.2 12.9 13.5    347 360 352   , RFP + Serum Mag Trend:   Recent Labs     02/10/25  1221 06/05/24  0958 05/20/24  1053 01/17/24  1035 01/17/24  1035 09/12/23  0919 07/23/23  0613 07/22/23  1201 06/24/20  1045 03/25/20  0501   GLUCOSE 70  --  68*  --  65* 75   < > 74   < > 85     --  138  --  140 138   < > 135*   < > 135*   K 5.1  --  4.5  --  5.0 4.3   < > 4.3   < > 3.8     --  106  --  106 106   < > 107   < > 101   CO2 25  --  25  --  27 26   < > 20*   < > 23   ANIONGAP  --   --  12  --  12 10   < > 12   < > 15   BUN 15  --  15  --  16 16   < > 23   < > 13   CREATININE 1.69*  --  1.66*  --  1.60* 2.00*   < > 2.49*   < > 1.40*   EGFR 44*  --  45*  --  47*  --   --   --   --   --    CALCIUM 10.2  --  9.3  --  9.2 8.7   < > 8.3*   < > 9.2   PHOS 4.6*  --  3.9  --  3.2 3.6  --   --    < >  --    ALBUMIN 4.5 4.4 4.2   < > 3.8 4.3   < > 3.9   < >  --    MG  --   --   --   --   --   --   --  1.80  --  1.80    < > = values in this interval not displayed.   , LFT Trend:   Recent Labs     02/10/25  1221 06/05/24  0958 05/20/24  1053 09/12/23  0919 07/23/23  0613 07/22/23  1201 05/17/22  1003 04/22/22  0857   ALBUMIN 4.5 4.4 4.2   < > 3.5 3.9   < > 4.1   BILITOT  --  0.8  --   --  0.9 0.7   < > 0.7   BILIDIR  --  0.2  --   --   --  0.1  --  0.2   ALKPHOS  --  106  --   --  59 65   < > 103   ALT  --  11  --   --  6* 7*   < > 8*   AST  --  18  --   --  12 13   < > 14   PROT  --  7.1  --   --  5.9* 6.2*   < > 6.7    < > = values in this interval not displayed.   , DM Specific Labs Trend (Includes HgbA1C, antibodies & fasting insulin): No results for input(s): \"HGBA1C\", \"CPEPTIDE\", \"INSULFAST\" in the last 12822 hours.    No lab exists for component: \"BHDRXBUT\", \"TNS6GJV\", Lipid " "Panel Trend:    Recent Labs     06/05/24  0958 06/15/23  0937 06/25/22  0930 10/25/21  0900   CHOL 133 121 115 119   HDL 46.1 48.4 48.9 43.0   LDLCALC 54  --   --   --    LDLF  --  42 56 57   VLDL 33 31 10 19   TRIG 167* 154* 49 93   , Iron Panel + Serum Ferritin Trend:   Recent Labs     09/28/20  0000   IRON 83   TIBC 372   IRONSAT 22*   , Vitamin B12: No results found for: \"HJGWYBWM78\" , Folate: No results found for: \"FOLATE\" , Vitamin D:   Lab Results   Component Value Date    VITD25 51 10/08/2024    , and CRP Trend (last 3): No results found for: \"HSCRP\"     Medications:  Current Outpatient Medications   Medication Instructions    amLODIPine (NORVASC) 5 mg, oral, Daily    aspirin 81 mg EC tablet 1 tablet, Daily    atorvastatin (LIPITOR) 80 mg, oral, Daily    cholecalciferol (Vitamin D-3) 25 MCG (1000 UT) tablet 1 tablet, Daily    clopidogrel (PLAVIX) 75 mg, oral, Daily    dapagliflozin propanediol (FARXIGA) 10 mg, oral, Daily    EPINEPHrine (EPIPEN) 0.3 mg, intramuscular, Once as needed    isosorbide mononitrate ER (IMDUR) 60 mg, oral, Daily    losartan (COZAAR) 100 mg, oral, Daily    metoprolol succinate XL (TOPROL-XL) 25 mg, oral, Daily    nicotine polacrilex (NICORETTE) 4 mg, As needed    nicotine polacrilex (NICORETTE) 4 mg, Mouth/Throat, As needed    nitroglycerin (NITROSTAT) 0.4 mg, sublingual, Every 5 min PRN, May repeat every 5 minutes for up to 3 doses.    spironolactone (ALDACTONE) 25 mg, oral, Daily    zoster vaccine-recombinant adjuvanted (Shingrix, PF,) 50 mcg/0.5 mL vaccine Inject into the muscle.        Estimated Needs:   ;     ;     ;     ;                    Nutrition Diagnosis                Nutrition Interventions/Recommendations   Nutrition Prescription:        Nutrition Interventions:   Food and Nutrient Delivery:       Coordination of Care:       Nutrition Education:   Nutrition Education Content:       Nutrition Education Application         Nutrition Education Topics Discussed: "   Explained Diet For CKD:  Sodium: We reviewed the importance and compliance with a 2 gm sodium diet. Recommend decreasing high sodium foods such as soups, gravies, regular deli meats, condiments, prepackaged foods, crackers, chips.  One teaspoon of salt contain more than 2000 mg of sodium.  When reviewing a food label, choose foods than have less than 20% of the daily value of sodium. Dicussed with pt limiting amount of eating out and to avoid adding salt to his food.   Phosphorous: The kidneys mainly control the balance of phosphorus in the body. When phosphorus builds up in the body, it  causes calcium to come out of your bones. This can lead to weak bones that can be painful and break easily.  Too much phosphorus in your blood can also lead to heart disease. Went over food choices high/low in phosphorus. Pt recent lab value within range.   Fluid: Drink fluids to keep hydrated. Water is best. Dicussed that his fluid needs are about 64 oz per day.   Potassium: The muscles and nerves in your body use potassium to function. Too much or too little potassium can  prevent your heart muscle from working properly. If you have been told to limit your potassium intake, you will want to be especially careful about eating fruits and vegetables that are high in  potassium. Reviewed foods high/low in potassium.   Protein : The body uses protein to build and maintain bones, muscle, skin, and hair. It is also needed to help  prevent and fight infections. With kidney damage, you need a lower-protein diet to help prevent your kidney function from worsening. Reviewed lean protein sources with pt and suggested not exceeding 68 grams of protein per day.      Educational Handouts Provided:     Nutrition Counseling:        Patient Goals:      {Readiness to Change (Optional):96142}  {Level of Understanding (Optional):17643}  {Anticipated Compliant (Optional):49662}         Nutrition Monitoring and Evaluation                             Goal Status:           Follow Up: {OPNOTEFOLLOWUP (Optional):88347}

## 2025-04-04 ENCOUNTER — HOSPITAL ENCOUNTER (OUTPATIENT)
Dept: RADIOLOGY | Facility: HOSPITAL | Age: 70
Discharge: HOME | End: 2025-04-04
Payer: COMMERCIAL

## 2025-04-04 ENCOUNTER — APPOINTMENT (OUTPATIENT)
Dept: NUTRITION | Facility: HOSPITAL | Age: 70
End: 2025-04-04
Payer: COMMERCIAL

## 2025-04-04 DIAGNOSIS — R97.20 ELEVATED PSA: ICD-10-CM

## 2025-04-04 DIAGNOSIS — N18.32 STAGE 3B CHRONIC KIDNEY DISEASE (CKD) (MULTI): Primary | ICD-10-CM

## 2025-04-04 PROCEDURE — 72197 MRI PELVIS W/O & W/DYE: CPT | Performed by: RADIOLOGY

## 2025-04-04 PROCEDURE — A9575 INJ GADOTERATE MEGLUMI 0.1ML: HCPCS | Performed by: NURSE PRACTITIONER

## 2025-04-04 PROCEDURE — 2550000001 HC RX 255 CONTRASTS: Performed by: NURSE PRACTITIONER

## 2025-04-04 PROCEDURE — 72197 MRI PELVIS W/O & W/DYE: CPT

## 2025-04-04 RX ORDER — GADOTERATE MEGLUMINE 376.9 MG/ML
16 INJECTION INTRAVENOUS
Status: COMPLETED | OUTPATIENT
Start: 2025-04-04 | End: 2025-04-04

## 2025-04-04 RX ADMIN — GADOTERATE MEGLUMINE 16 ML: 376.9 INJECTION INTRAVENOUS at 08:57

## 2025-04-08 NOTE — PROGRESS NOTES
[facility]  Outpatient Clinic Note    Subjective   Kamron Girmm is a 69 y.o. male    History of Present Illness   Patient presenting today  for FUV. History of Elevated PSA.   Family history of prostate cancer (maternal uncle) Negative biopsy 7/18/19 with Dr. Almazan  Negative MRI 8/12/21. CAD (NSTEMI 3/24/20 treated with cardiac rehab) on Brilinta HTN.   Chronic renal insufficiency followed by Dr. Walter  PVR 20 ml      Today's visit; Patient has been doing well overall. Occasional daytime frequency. Patient notes occasional slow flow. Denies hematuria, dysuria, flank pain, and bothersome frequency or urgency.   Patient is a current smoker.   We discussed PSA and MRI results today     11/2/2023 Prostate MRI   1. There is no evidence of clinically significant neoplasm.  2. BPH changes of the transition zone. Diffuse non nodular  hypointensities within the peripheral zone, without evidence of  focally restricted diffusion (PI-RADS 2).    PI-RADS 2 - Low (clinically significant cancer is unlikely to be  present).    I personally reviewed prostate MRI from 4/42025  1.  Left mid anterior ill-defined area in the anterior transition  zone consistent with a PI-RADS 3 lesion.  2. Hypertrophy of the transition zone consistent benign prostatic  hyperplasia.      Lab Results   Component Value Date    PSA 13.60 (H) 02/10/2025    PSA 9.89 (H) 05/20/2024    PSA 6.23 (H) 09/27/2023    PSA 8.27 (H) 02/15/2023    PSA 8.39 (H) 06/24/2021       Past Medical History and Surgical History   Past Medical History:   Diagnosis Date    Encounter for general adult medical examination without abnormal findings 06/24/2022    Physical exam, annual    Encounter for general adult medical examination without abnormal findings 06/24/2021    Physical exam, annual    Encounter for screening for malignant neoplasm of colon 09/24/2020    Colon cancer screening    Other symptoms and signs involving the nervous system 11/08/2019    Suspected  sleep apnea    Personal history of other endocrine, nutritional and metabolic disease 05/08/2019    History of hypokalemia     Past Surgical History:   Procedure Laterality Date    OTHER SURGICAL HISTORY  04/03/2019    Inguinal hernia repair laparoscopic    OTHER SURGICAL HISTORY  04/23/2019    Hernia repair       Medications  Current Outpatient Medications on File Prior to Visit   Medication Sig Dispense Refill    amLODIPine (Norvasc) 5 mg tablet Take 1 tablet (5 mg) by mouth once daily. 90 tablet 3    aspirin 81 mg EC tablet Take 1 tablet (81 mg) by mouth once daily.      atorvastatin (Lipitor) 80 mg tablet Take 1 tablet (80 mg) by mouth once daily. 90 tablet 1    cholecalciferol (Vitamin D-3) 25 MCG (1000 UT) tablet Take 1 tablet (25 mcg) by mouth once daily.      clopidogrel (Plavix) 75 mg tablet Take 1 tablet (75 mg) by mouth once daily. 90 tablet 1    dapagliflozin propanediol (Farxiga) 10 mg Take 1 tablet (10 mg) by mouth once daily. 90 tablet 3    EPINEPHrine 0.3 mg/0.3 mL injection syringe Inject 0.3 mL (0.3 mg) into the muscle 1 time if needed for anaphylaxis for up to 8 doses. 2 each 3    isosorbide mononitrate ER (Imdur) 60 mg 24 hr tablet Take 1 tablet (60 mg) by mouth once daily. 90 tablet 2    losartan (Cozaar) 100 mg tablet Take 1 tablet (100 mg) by mouth once daily. 90 tablet 3    metoprolol succinate XL (Toprol-XL) 25 mg 24 hr tablet Take 1 tablet (25 mg) by mouth once daily. 90 tablet 2    nicotine polacrilex (Nicorette) 4 mg gum Chew 1 each (4 mg) if needed for smoking cessation. Chew once piece slowly and intermittently for 30 minutes.  Repeat every 1-2 hours maximum of 24 pieces a day.      nicotine polacrilex (Nicorette) 4 mg gum Chew 1 each (4 mg) if needed for smoking cessation. 100 each 0    nitroglycerin (Nitrostat) 0.4 mg SL tablet Place 1 tablet (0.4 mg) under the tongue every 5 minutes if needed for chest pain. May repeat every 5 minutes for up to 3 doses. 100 tablet 11     spironolactone (Aldactone) 25 mg tablet Take 1 tablet (25 mg) by mouth once daily. 90 tablet 3    zoster vaccine-recombinant adjuvanted (Shingrix, PF,) 50 mcg/0.5 mL vaccine Inject into the muscle.       No current facility-administered medications on file prior to visit.       Objective   Physicial Exam  General: Well developed, well nourished, alert and cooperative, appears in no acute distress  Eyes: Non-injected conjunctiva, sclera clear, no proptosis  Cardiac: Extremities are warm and well perfused. No edema, cyanosis or pallor.   Lungs: Breathing is easy, non-labored. Speaking in clear and complete sentences. Normal diaphragmatic movement.  MSK: Ambulatory with steady gait, unassisted  Neuro: alert and oriented to person, place and time  Psych: Demonstrates good judgement and reason, without hallucinations, abnormal affect or abnormal behaviors.  Skin: no obvious lesions, no rashes.    No visits with results within 30 Day(s) from this visit.   Latest known visit with results is:   Orders Only on 02/10/2025   Component Date Value Ref Range Status    PSA, TOTAL 02/10/2025 13.60 (H)  < OR = 4.00 ng/mL Final    Comment: The total PSA value from this assay system is   standardized against the WHO standard. The test   result will be approximately 20% lower when compared   to the equimolar-standardized total PSA ([place]   [place]). Comparison of serial PSA results should be   interpreted with this fact in mind.     This test was performed using the Siemens   chemiluminescent method. Values obtained from   different assay methods cannot be used  interchangeably. PSA levels, regardless of  value, should not be interpreted as absolute  evidence of the presence or absence of disease.        Review of Systems  All other systems have been reviewed and are negative for complaint.      Assessment and Plan   (Elevated PSA)  Today we discussed PSA as a tool to screen gentleman for prostate cancer. We discussed that many  factors can elevate the PSA, and when the PSA is elevated further testing is warranted. I explained that while PSA is used for  prostate cancer screening, it is not specific to prostate cancer and can be elevated with benign growth of the prostate. We discussed proceeding with prostate biopsy, or continuing to monitor his PSA.      Prostate MRI 10/31/2023 demonstrated at 80.2 g prostate, PI-RADS 2 lesion with no evidence of clinically significant malignancy     I personally reviewed prostate MRI from 4/56355  1.  Left mid anterior ill-defined area in the anterior transition  zone consistent with a PI-RADS 3 lesion.  2. Hypertrophy of the transition zone consistent benign prostatic  hyperplasia.    5/2024 PSA 9.89 ng/mL   2/10/2025 PSA 13.60 ng/mL     We discussed MRI findings in great detail. I discussed with the patient that the prostate MRI has a high sensitivity for high-grade prostate cancer lesions but a lower sensitivity for low to intermediate prostate cancer lesions. Given MRI findings, paired with elevated PSA, would recommend proceeding with prostate biopsy, explained. Risks, benefits and alternatives discussed. We discussed prostate biopsies are performed in-office with local anesthesia. You can drive yourself to and from the appointment. Please eat a light meal prior. You will need to use an over-the-counter enema the night prior. If you are taking blood thinners, you will need to temporarily stop prior to prostate biopsy. Will proceed with scheduling prostate biopsy,     Patient will be schedule for Prostate biopsy with Dr. Ferris     All questions and concerns were addressed. Patient verbalizes understanding and has no other questions at this time.     Vicky Joyner-- KAREEN MARIE  Office Phone:  [phone]

## 2025-04-09 ENCOUNTER — OFFICE VISIT (OUTPATIENT)
Dept: UROLOGY | Facility: CLINIC | Age: 70
End: 2025-04-09
Payer: COMMERCIAL

## 2025-04-09 DIAGNOSIS — R39.9 URINARY SYMPTOM OR SIGN: ICD-10-CM

## 2025-04-09 DIAGNOSIS — N40.1 BPH WITH OBSTRUCTION/LOWER URINARY TRACT SYMPTOMS: ICD-10-CM

## 2025-04-09 DIAGNOSIS — R97.20 ELEVATED PSA: Primary | ICD-10-CM

## 2025-04-09 DIAGNOSIS — N13.8 BPH WITH OBSTRUCTION/LOWER URINARY TRACT SYMPTOMS: ICD-10-CM

## 2025-04-09 LAB
POC APPEARANCE, URINE: CLEAR
POC BILIRUBIN, URINE: NEGATIVE
POC BLOOD, URINE: NEGATIVE
POC COLOR, URINE: YELLOW
POC GLUCOSE, URINE: ABNORMAL MG/DL
POC KETONES, URINE: NEGATIVE MG/DL
POC LEUKOCYTES, URINE: ABNORMAL
POC NITRITE,URINE: NEGATIVE
POC PH, URINE: 7 PH
POC PROTEIN, URINE: NEGATIVE MG/DL
POC SPECIFIC GRAVITY, URINE: 1.01
POC UROBILINOGEN, URINE: 1 EU/DL

## 2025-04-09 PROCEDURE — 1160F RVW MEDS BY RX/DR IN RCRD: CPT | Performed by: NURSE PRACTITIONER

## 2025-04-09 PROCEDURE — 1159F MED LIST DOCD IN RCRD: CPT | Performed by: NURSE PRACTITIONER

## 2025-04-09 PROCEDURE — G2211 COMPLEX E/M VISIT ADD ON: HCPCS | Performed by: NURSE PRACTITIONER

## 2025-04-09 PROCEDURE — 99214 OFFICE O/P EST MOD 30 MIN: CPT | Performed by: NURSE PRACTITIONER

## 2025-04-09 PROCEDURE — 81003 URINALYSIS AUTO W/O SCOPE: CPT | Performed by: NURSE PRACTITIONER

## 2025-04-10 ENCOUNTER — TELEPHONE (OUTPATIENT)
Dept: UROLOGY | Facility: HOSPITAL | Age: 70
End: 2025-04-10
Payer: COMMERCIAL

## 2025-04-10 LAB
APPEARANCE UR: CLEAR
BACTERIA #/AREA URNS HPF: ABNORMAL /HPF
BILIRUB UR QL STRIP: NEGATIVE
COLOR UR: YELLOW
GLUCOSE UR QL STRIP: ABNORMAL
HGB UR QL STRIP: NEGATIVE
HYALINE CASTS #/AREA URNS LPF: ABNORMAL /LPF
KETONES UR QL STRIP: NEGATIVE
LEUKOCYTE ESTERASE UR QL STRIP: ABNORMAL
NITRITE UR QL STRIP: NEGATIVE
PH UR STRIP: 7 [PH] (ref 5–8)
PROT UR QL STRIP: NEGATIVE
RBC #/AREA URNS HPF: ABNORMAL /HPF
SERVICE CMNT-IMP: ABNORMAL
SP GR UR STRIP: 1.02 (ref 1–1.03)
SQUAMOUS #/AREA URNS HPF: ABNORMAL /HPF
WBC #/AREA URNS HPF: ABNORMAL /HPF

## 2025-04-11 LAB — BACTERIA UR CULT: NORMAL

## 2025-05-09 ENCOUNTER — TELEPHONE (OUTPATIENT)
Dept: UROLOGY | Facility: HOSPITAL | Age: 70
End: 2025-05-09
Payer: COMMERCIAL

## 2025-05-09 NOTE — TELEPHONE ENCOUNTER
Attempted to call patient to discuss upcoming prostate biopsy. No answer and voicemail is full. Will send My Chart message.    Juliann Ramon RN

## 2025-05-12 ENCOUNTER — TELEPHONE (OUTPATIENT)
Dept: UROLOGY | Facility: HOSPITAL | Age: 70
End: 2025-05-12
Payer: COMMERCIAL

## 2025-05-12 NOTE — TELEPHONE ENCOUNTER
LVM informing patient that I need to speak to him re: blood thinners prior to his upcoming appointment.    Juliann Ramon RN

## 2025-05-13 ENCOUNTER — TELEPHONE (OUTPATIENT)
Dept: UROLOGY | Facility: HOSPITAL | Age: 70
End: 2025-05-13
Payer: COMMERCIAL

## 2025-05-13 NOTE — TELEPHONE ENCOUNTER
Returned call to patient. No answer. Left voicemail that Plavix is listed on his medication list and that it needs to be stopped within a certain time frame for the biopsy and that he may need rescheduled. Encouraged patient to call back to discuss.    Juliann Ramon RN

## 2025-05-16 ENCOUNTER — APPOINTMENT (OUTPATIENT)
Dept: UROLOGY | Facility: HOSPITAL | Age: 70
End: 2025-05-16
Payer: COMMERCIAL

## 2025-06-02 ENCOUNTER — APPOINTMENT (OUTPATIENT)
Dept: PRIMARY CARE | Facility: CLINIC | Age: 70
End: 2025-06-02
Payer: MEDICARE

## 2025-06-02 VITALS
SYSTOLIC BLOOD PRESSURE: 125 MMHG | WEIGHT: 163.5 LBS | TEMPERATURE: 97.7 F | BODY MASS INDEX: 19.91 KG/M2 | HEIGHT: 76 IN | HEART RATE: 56 BPM | OXYGEN SATURATION: 96 % | DIASTOLIC BLOOD PRESSURE: 78 MMHG

## 2025-06-02 DIAGNOSIS — F17.210 SMOKING GREATER THAN 20 PACK YEARS: ICD-10-CM

## 2025-06-02 DIAGNOSIS — I12.9 HYPERTENSIVE KIDNEY DISEASE WITH STAGE 3B CHRONIC KIDNEY DISEASE (MULTI): ICD-10-CM

## 2025-06-02 DIAGNOSIS — H90.5: ICD-10-CM

## 2025-06-02 DIAGNOSIS — Q87.89: ICD-10-CM

## 2025-06-02 DIAGNOSIS — N18.32 STAGE 3B CHRONIC KIDNEY DISEASE (MULTI): ICD-10-CM

## 2025-06-02 DIAGNOSIS — Z00.00 ROUTINE GENERAL MEDICAL EXAMINATION AT A HEALTH CARE FACILITY: Primary | ICD-10-CM

## 2025-06-02 DIAGNOSIS — N18.4 STAGE 4 CHRONIC KIDNEY DISEASE (MULTI): ICD-10-CM

## 2025-06-02 DIAGNOSIS — N18.32 HYPERTENSIVE KIDNEY DISEASE WITH STAGE 3B CHRONIC KIDNEY DISEASE (MULTI): ICD-10-CM

## 2025-06-02 DIAGNOSIS — E88.49: ICD-10-CM

## 2025-06-02 PROCEDURE — 1126F AMNT PAIN NOTED NONE PRSNT: CPT | Performed by: FAMILY MEDICINE

## 2025-06-02 PROCEDURE — 3008F BODY MASS INDEX DOCD: CPT | Performed by: FAMILY MEDICINE

## 2025-06-02 PROCEDURE — 1159F MED LIST DOCD IN RCRD: CPT | Performed by: FAMILY MEDICINE

## 2025-06-02 PROCEDURE — G0444 DEPRESSION SCREEN ANNUAL: HCPCS | Performed by: FAMILY MEDICINE

## 2025-06-02 PROCEDURE — G0439 PPPS, SUBSEQ VISIT: HCPCS | Performed by: FAMILY MEDICINE

## 2025-06-02 PROCEDURE — 1123F ACP DISCUSS/DSCN MKR DOCD: CPT | Performed by: FAMILY MEDICINE

## 2025-06-02 PROCEDURE — 99214 OFFICE O/P EST MOD 30 MIN: CPT | Performed by: FAMILY MEDICINE

## 2025-06-02 PROCEDURE — 3078F DIAST BP <80 MM HG: CPT | Performed by: FAMILY MEDICINE

## 2025-06-02 PROCEDURE — 3074F SYST BP LT 130 MM HG: CPT | Performed by: FAMILY MEDICINE

## 2025-06-02 RX ORDER — DAPAGLIFLOZIN 10 MG/1
10 TABLET, FILM COATED ORAL DAILY
Qty: 90 TABLET | Refills: 3 | Status: SHIPPED | OUTPATIENT
Start: 2025-06-02 | End: 2026-06-02

## 2025-06-02 ASSESSMENT — PATIENT HEALTH QUESTIONNAIRE - PHQ9
2. FEELING DOWN, DEPRESSED OR HOPELESS: NOT AT ALL
SUM OF ALL RESPONSES TO PHQ9 QUESTIONS 1 AND 2: 0
1. LITTLE INTEREST OR PLEASURE IN DOING THINGS: NOT AT ALL

## 2025-06-02 ASSESSMENT — PAIN SCALES - GENERAL: PAINLEVEL_OUTOF10: 0-NO PAIN

## 2025-06-02 NOTE — PATIENT INSTRUCTIONS
Baby aspirin    And Clopidogrel      Other to blood thinners you are on    Amlodipine atorvastatin metoprolol Farxiga isosorbide spironolactone losartan none of them are blood thinners except atorvastatin everything else is for your heart and your blood pressure    Please make sure you see the eye doctor    And I also renewed the farxiga      Your next appointment with your heart doctor is in September    I have ordered thyroid function test lipid panel liver function test please go to the lab directly and get them drawn    Also do not forget to see the eye doctor    Keep your appointment with the kidney doctor    Please see me back in 6-month

## 2025-06-02 NOTE — PROGRESS NOTES
"Subjective   Patient ID: Kamron Grimm is a 69 y.o. male who presents for [facility] wellness.    HPI     Review of Systems   All other systems reviewed and are negative.      Objective   /78   Pulse 56   Temp 36.5 °C (97.7 °F)   Ht 1.93 m (6' 4\")   Wt 74.2 kg (163 lb 8 oz)   SpO2 96%   BMI 19.90 kg/m²     Physical Exam  Cardiovascular:      Rate and Rhythm: Regular rhythm.      Heart sounds: Normal heart sounds.   Pulmonary:      Breath sounds: Normal breath sounds.   Abdominal:      Palpations: Abdomen is soft.   Musculoskeletal:         General: Normal range of motion.   Skin:     General: Skin is warm.      Coloration: Jaundice: 60150.   Neurological:      General: No focal deficit present.   Psychiatric:         Mood and Affect: Mood normal.       Assessment/Plan     This is a 69-year-old male patient who is here for his Medicare well visit physical exam and problem-oriented visit    I spoke to the patient explained what a living will is, and medical power of .  I also advised the patient how to get a living will.  Explained to patient the importance of having the living will or medical power of .--16 minutes    Depression screen-5 minutes and its negative    Still patient continues to smoke about 5 cigarettes a day    Recently he was told he might be having prostate cancer for him to call for prostate biopsy advised him to stay off Plavix and aspirin for 1 week before the biopsy    Congestive heart failure coronary artery disease the cardiology appointment will be September      Chronic renal failure he has a kidney doctor nephrology appointment coming up    I also referred him to audiology ophthalmology    Also ordered lab work such as lipid panel thyroid panel for him to get it drawn today at one of the  labs    Follow-up in 6 months         "

## 2025-06-04 LAB
ALBUMIN SERPL-MCNC: 4.4 G/DL (ref 3.6–5.1)
ALBUMIN/GLOB SERPL: 1.8 (CALC) (ref 1–2.5)
ALP SERPL-CCNC: 93 U/L (ref 35–144)
ALT SERPL-CCNC: 9 U/L (ref 9–46)
AST SERPL-CCNC: 17 U/L (ref 10–35)
BILIRUB DIRECT SERPL-MCNC: 0.2 MG/DL
BILIRUB INDIRECT SERPL-MCNC: 0.6 MG/DL (CALC) (ref 0.2–1.2)
BILIRUB SERPL-MCNC: 0.8 MG/DL (ref 0.2–1.2)
CHOLEST SERPL-MCNC: 137 MG/DL
CHOLEST/HDLC SERPL: 2.5 (CALC)
GLOBULIN SER CALC-MCNC: 2.4 G/DL (CALC) (ref 1.9–3.7)
HDLC SERPL-MCNC: 54 MG/DL
LDLC SERPL CALC-MCNC: 58 MG/DL (CALC)
NONHDLC SERPL-MCNC: 83 MG/DL (CALC)
PROT SERPL-MCNC: 6.8 G/DL (ref 6.1–8.1)
TRIGL SERPL-MCNC: 175 MG/DL
TSH SERPL-ACNC: 1.35 MIU/L (ref 0.4–4.5)

## 2025-06-05 ENCOUNTER — APPOINTMENT (OUTPATIENT)
Dept: UROLOGY | Facility: HOSPITAL | Age: 70
End: 2025-06-05
Payer: COMMERCIAL

## 2025-06-23 NOTE — PROGRESS NOTES
FUV    Last Visit: 6/26/25     HISTORY OF PRESENT ILLNESS:   Kamron Grimm is a 69 y.o. male who is being seen today for prostate biopsy results  -History of Elevated PSA.   -Family history of prostate cancer (maternal uncle)   -Negative biopsy 7/18/19 with Dr. Almazan  -Negative MRI 8/12/21. CAD (NSTEMI 3/24/20 treated with cardiac rehab) on Brilinta HTN.   -Chronic renal insufficiency followed by Dr. Walter  -PI-RADS 2 on MRI 11/2/23  -PI-RADS 3 lesion on prostate MRI from 4/4/25  PAST MEDICAL HISTORY:  Medical History[1]    PAST SURGICAL HISTORY:  Surgical History[2]     ALLERGIES:   Allergies[3]     MEDICATIONS:   Current Outpatient Medications   Medication Instructions    amLODIPine (NORVASC) 5 mg, oral, Daily    aspirin 81 mg EC tablet 1 tablet, Daily    atorvastatin (LIPITOR) 80 mg, oral, Daily    cholecalciferol (Vitamin D-3) 25 MCG (1000 UT) tablet 1 tablet, Daily    clopidogrel (PLAVIX) 75 mg, oral, Daily    dapagliflozin propanediol (FARXIGA) 10 mg, oral, Daily    EPINEPHrine (EPIPEN) 0.3 mg, intramuscular, Once as needed    isosorbide mononitrate ER (IMDUR) 60 mg, oral, Daily    losartan (COZAAR) 100 mg, oral, Daily    metoprolol succinate XL (TOPROL-XL) 25 mg, oral, Daily    nicotine polacrilex (NICORETTE) 4 mg, As needed    nicotine polacrilex (NICORETTE) 4 mg, Mouth/Throat, As needed    nitroglycerin (NITROSTAT) 0.4 mg, sublingual, Every 5 min PRN, May repeat every 5 minutes for up to 3 doses.    spironolactone (ALDACTONE) 25 mg, oral, Daily    zoster vaccine-recombinant adjuvanted (Shingrix, PF,) 50 mcg/0.5 mL vaccine Inject into the muscle.        PHYSICAL EXAM:  There were no vitals taken for this visit.  Constitutional: Patient appears well-developed and well-nourished. No distress.    Pulmonary/Chest: Effort normal. No respiratory distress.   Abdominal: Soft, ND NT  : WNL  Musculoskeletal: Normal range of motion.    Neurological: Alert and oriented to person, place, and time.  Psychiatric:  "Normal mood and affect. Behavior is normal. Thought content normal.      Labs:  No results found for: \"TESTOSTERONE\"  Lab Results   Component Value Date    PSA 13.60 (H) 02/10/2025    PSA 9.89 (H) 05/20/2024    PSA 6.23 (H) 09/27/2023    PSA 8.27 (H) 02/15/2023    PSA 8.39 (H) 06/24/2021     No components found for: \"CBC\"  Lab Results   Component Value Date    CREATININE 1.69 (H) 02/10/2025     No components found for: \"TESTOTMS\"  No results found for: \"TESTF\"    PVR:  AUA:   MICHELLE:    Imaging:     Surgical pathology, 6/26/25:     Discussion: Path reviewed with patient.       Assessment:    No diagnosis found.       Plan:     All questions and concerns were addressed. Patient verbalizes understanding and has no other questions at this time.          [1]   Past Medical History:  Diagnosis Date    Encounter for general adult medical examination without abnormal findings 06/24/2022    Physical exam, annual    Encounter for general adult medical examination without abnormal findings 06/24/2021    Physical exam, annual    Encounter for screening for malignant neoplasm of colon 09/24/2020    Colon cancer screening    Other symptoms and signs involving the nervous system 11/08/2019    Suspected sleep apnea    Personal history of other endocrine, nutritional and metabolic disease 05/08/2019    History of hypokalemia   [2]   Past Surgical History:  Procedure Laterality Date    OTHER SURGICAL HISTORY  04/03/2019    Inguinal hernia repair laparoscopic    OTHER SURGICAL HISTORY  04/23/2019    Hernia repair   [3]   Allergies  Allergen Reactions    Bee Venom Protein (Honey Bee) Swelling    Lisinopril Cough and Unknown    Prednisone Nausea/vomiting     "

## 2025-06-23 NOTE — PROGRESS NOTES
NPV     HISTORY OF PRESENT ILLNESS:   Kamron Grimm is a 69 y.o. male who is being seen today for prostate biopsy  -History of Elevated PSA.   -Family history of prostate cancer (maternal uncle)   -Negative biopsy 7/18/19 with Dr. Almazan  -Negative MRI 8/12/21. CAD (NSTEMI 3/24/20 treated with cardiac rehab) on Brilinta HTN.   -Chronic renal insufficiency followed by Dr. Walter  -PI-RADS 2 on MRI 11/2/23  -PI-RADS 3 lesion on prostate MRI from 4/4/25  PAST MEDICAL HISTORY:  Medical History[1]    PAST SURGICAL HISTORY:  Surgical History[2]     ALLERGIES:   Allergies[3]     MEDICATIONS:   Current Outpatient Medications   Medication Instructions    amLODIPine (NORVASC) 5 mg, oral, Daily    aspirin 81 mg EC tablet 1 tablet, Daily    atorvastatin (LIPITOR) 80 mg, oral, Daily    cholecalciferol (Vitamin D-3) 25 MCG (1000 UT) tablet 1 tablet, Daily    clopidogrel (PLAVIX) 75 mg, oral, Daily    dapagliflozin propanediol (FARXIGA) 10 mg, oral, Daily    EPINEPHrine (EPIPEN) 0.3 mg, intramuscular, Once as needed    isosorbide mononitrate ER (IMDUR) 60 mg, oral, Daily    losartan (COZAAR) 100 mg, oral, Daily    metoprolol succinate XL (TOPROL-XL) 25 mg, oral, Daily    nicotine polacrilex (NICORETTE) 4 mg, As needed    nicotine polacrilex (NICORETTE) 4 mg, Mouth/Throat, As needed    nitroglycerin (NITROSTAT) 0.4 mg, sublingual, Every 5 min PRN, May repeat every 5 minutes for up to 3 doses.    spironolactone (ALDACTONE) 25 mg, oral, Daily    zoster vaccine-recombinant adjuvanted (Shingrix, PF,) 50 mcg/0.5 mL vaccine Inject into the muscle.        PHYSICAL EXAM:  There were no vitals taken for this visit.  Constitutional: Patient appears well-developed and well-nourished. No distress.    Pulmonary/Chest: Effort normal. No respiratory distress.   Abdominal: Soft, ND NT  : WNL  Musculoskeletal: Normal range of motion.    Neurological: Alert and oriented to person, place, and time.  Psychiatric: Normal mood and affect. Behavior  "is normal. Thought content normal.      Labs:  No results found for: \"TESTOSTERONE\"  Lab Results   Component Value Date    PSA 13.60 (H) 02/10/2025    PSA 9.89 (H) 05/20/2024    PSA 6.23 (H) 09/27/2023    PSA 8.27 (H) 02/15/2023    PSA 8.39 (H) 06/24/2021     No components found for: \"CBC\"  Lab Results   Component Value Date    CREATININE 1.69 (H) 02/10/2025     No components found for: \"TESTOTMS\"  No results found for: \"TESTF\"    PVR:  AUA:   MICHELLE:    Imaging:     Discussion: Fusion Biopsy with TRUS of ROIs with standard biopsy was completed today without complications and he tolerated the procedure well. Transrectal diagnostic ultrasound guidance for needle biopsy of the prostate was used. Specimen obtained from  MYRON's, Right Base, Right Mid, Right [place], Left Base, Left Mid and Left [place]. Post-biopsy care instructions were provided to patient. Will follow up in 2 weeks to discuss biopsy results.        Assessment:    No diagnosis found.       Plan:     All questions and concerns were addressed. Patient verbalizes understanding and has no other questions at this time.          [1]   Past Medical History:  Diagnosis Date    Encounter for general adult medical examination without abnormal findings 06/24/2022    Physical exam, annual    Encounter for general adult medical examination without abnormal findings 06/24/2021    Physical exam, annual    Encounter for screening for malignant neoplasm of colon 09/24/2020    Colon cancer screening    Other symptoms and signs involving the nervous system 11/08/2019    Suspected sleep apnea    Personal history of other endocrine, nutritional and metabolic disease 05/08/2019    History of hypokalemia   [2]   Past Surgical History:  Procedure Laterality Date    OTHER SURGICAL HISTORY  04/03/2019    Inguinal hernia repair laparoscopic    OTHER SURGICAL HISTORY  04/23/2019    Hernia repair   [3]   Allergies  Allergen Reactions    Bee Venom Protein (Honey Bee) Swelling    Lisinopril " Cough and Unknown    Prednisone Nausea/vomiting

## 2025-06-24 RX ORDER — CEFTRIAXONE 1 G/1
1 INJECTION, POWDER, FOR SOLUTION INTRAMUSCULAR; INTRAVENOUS ONCE
Status: SHIPPED | OUTPATIENT
Start: 2025-06-26

## 2025-06-26 ENCOUNTER — PROCEDURE VISIT (OUTPATIENT)
Dept: UROLOGY | Facility: HOSPITAL | Age: 70
End: 2025-06-26
Payer: COMMERCIAL

## 2025-06-26 VITALS — SYSTOLIC BLOOD PRESSURE: 115 MMHG | DIASTOLIC BLOOD PRESSURE: 71 MMHG | HEART RATE: 63 BPM

## 2025-06-26 DIAGNOSIS — Z79.2 PROPHYLACTIC ANTIBIOTIC: ICD-10-CM

## 2025-06-26 DIAGNOSIS — R39.9 LOWER URINARY TRACT SYMPTOMS (LUTS): ICD-10-CM

## 2025-06-26 DIAGNOSIS — R97.20 ELEVATED PSA: ICD-10-CM

## 2025-06-26 LAB
POC APPEARANCE, URINE: CLEAR
POC BILIRUBIN, URINE: NEGATIVE
POC BLOOD, URINE: NEGATIVE
POC COLOR, URINE: ABNORMAL
POC GLUCOSE, URINE: ABNORMAL MG/DL
POC KETONES, URINE: NEGATIVE MG/DL
POC LEUKOCYTES, URINE: ABNORMAL
POC NITRITE,URINE: NEGATIVE
POC PH, URINE: 6 PH
POC PROTEIN, URINE: ABNORMAL MG/DL
POC SPECIFIC GRAVITY, URINE: 1.02
POC UROBILINOGEN, URINE: 0.2 EU/DL

## 2025-06-26 PROCEDURE — 81003 URINALYSIS AUTO W/O SCOPE: CPT | Performed by: UROLOGY

## 2025-06-27 ENCOUNTER — TELEPHONE (OUTPATIENT)
Dept: UROLOGY | Facility: HOSPITAL | Age: 70
End: 2025-06-27
Payer: COMMERCIAL

## 2025-06-27 DIAGNOSIS — E78.9 LIPID DISORDER: ICD-10-CM

## 2025-06-27 RX ORDER — ATORVASTATIN CALCIUM 80 MG/1
80 TABLET, FILM COATED ORAL DAILY
Qty: 90 TABLET | Refills: 1 | Status: SHIPPED | OUTPATIENT
Start: 2025-06-27

## 2025-06-27 NOTE — TELEPHONE ENCOUNTER
LVM informing patient that there was an error in Dr. Ferris's schedule and that his clinic was supposed to be closed on 7/10, therefore it was required to move his biopsy. Told patient to stop his blood thinners on the 23rd if he intends to keep his rescheduled appointment on 7/31 and to still take the fleet enema two hours prior to his appointment. Left direct number to admin for rescheduling 7/31 date if it does not work for the patient.    Juliann Ramon RN

## 2025-07-10 ENCOUNTER — APPOINTMENT (OUTPATIENT)
Dept: UROLOGY | Facility: HOSPITAL | Age: 70
End: 2025-07-10
Payer: COMMERCIAL

## 2025-07-17 ENCOUNTER — APPOINTMENT (OUTPATIENT)
Dept: UROLOGY | Facility: CLINIC | Age: 70
End: 2025-07-17
Payer: MEDICARE

## 2025-07-18 DIAGNOSIS — I25.118 CORONARY ARTERY DISEASE INVOLVING NATIVE HEART WITH OTHER FORM OF ANGINA PECTORIS, UNSPECIFIED VESSEL OR LESION TYPE: ICD-10-CM

## 2025-07-20 RX ORDER — METOPROLOL SUCCINATE 25 MG/1
25 TABLET, EXTENDED RELEASE ORAL DAILY
Qty: 90 TABLET | Refills: 2 | Status: SHIPPED | OUTPATIENT
Start: 2025-07-20

## 2025-07-22 NOTE — PROGRESS NOTES
[facility] [place]  Outpatient Clinic Note    Subjective   Kamron Grimm is a 70 y.o. male    History of Present Illness   Patient presenting today  for FUV. History of Elevated PSA.   -History of Elevated PSA.   -Family history of prostate cancer (maternal uncle)   -Negative biopsy 7/18/19 with Dr. Almazan  -Negative MRI 8/12/21. CAD (NSTEMI 3/24/20 treated with cardiac rehab) on Brilinta HTN.   -Chronic renal insufficiency followed by Dr. Walter  -PI-RADS 2 on MRI 11/2/23  -PI-RADS 3 lesion on prostate MRI from 4/4/25    Scheduled with Dr. Ferris 7/31  Stopped Plavix and ASA yesterday      Today's visit; Patient has been doing well overall. Occasional daytime frequency. Patient notes occasional slow flow. NTF x 2 to 3 Denies hematuria, dysuria, flank pain, and bothersome frequency or urgency.   Patient is a current smoker 1/2 ppd >50 years     11/2/2023 Prostate MRI   1. There is no evidence of clinically significant neoplasm.  2. BPH changes of the transition zone. Diffuse non nodular  hypointensities within the peripheral zone, without evidence of  focally restricted diffusion (PI-RADS 2).    PI-RADS 2 - Low (clinically significant cancer is unlikely to be  present).    I personally reviewed prostate MRI from 4/42025  1.  Left mid anterior ill-defined area in the anterior transition  zone consistent with a PI-RADS 3 lesion.  2. Hypertrophy of the transition zone consistent benign prostatic  hyperplasia.      Lab Results   Component Value Date    PSA 13.60 (H) 02/10/2025    PSA 9.89 (H) 05/20/2024    PSA 6.23 (H) 09/27/2023    PSA 8.27 (H) 02/15/2023    PSA 8.39 (H) 06/24/2021       Past Medical History and Surgical History   Past Medical History:   Diagnosis Date    Encounter for general adult medical examination without abnormal findings 06/24/2022    Physical exam, annual    Encounter for general adult medical examination without abnormal findings 06/24/2021    Physical exam, annual    Encounter for  screening for malignant neoplasm of colon 09/24/2020    Colon cancer screening    Other symptoms and signs involving the nervous system 11/08/2019    Suspected sleep apnea    Personal history of other endocrine, nutritional and metabolic disease 05/08/2019    History of hypokalemia     Past Surgical History:   Procedure Laterality Date    OTHER SURGICAL HISTORY  04/03/2019    Inguinal hernia repair laparoscopic    OTHER SURGICAL HISTORY  04/23/2019    Hernia repair       Medications  Current Outpatient Medications on File Prior to Visit   Medication Sig Dispense Refill    amLODIPine (Norvasc) 5 mg tablet Take 1 tablet (5 mg) by mouth once daily. 90 tablet 3    aspirin 81 mg EC tablet Take 1 tablet (81 mg) by mouth once daily.      atorvastatin (Lipitor) 80 mg tablet Take 1 tablet (80 mg) by mouth once daily. 90 tablet 1    cholecalciferol (Vitamin D-3) 25 MCG (1000 UT) tablet Take 1 tablet (25 mcg) by mouth once daily.      clopidogrel (Plavix) 75 mg tablet Take 1 tablet (75 mg) by mouth once daily. 90 tablet 1    dapagliflozin propanediol (Farxiga) 10 mg tablet Take 1 tablet (10 mg) by mouth once daily. 90 tablet 3    EPINEPHrine 0.3 mg/0.3 mL injection syringe Inject 0.3 mL (0.3 mg) into the muscle 1 time if needed for anaphylaxis for up to 8 doses. 2 each 3    isosorbide mononitrate ER (Imdur) 60 mg 24 hr tablet Take 1 tablet (60 mg) by mouth once daily. 90 tablet 2    losartan (Cozaar) 100 mg tablet Take 1 tablet (100 mg) by mouth once daily. 90 tablet 3    metoprolol succinate XL (Toprol-XL) 25 mg 24 hr tablet Take 1 tablet (25 mg) by mouth once daily. 90 tablet 2    nicotine polacrilex (Nicorette) 4 mg gum Chew 1 each (4 mg) if needed for smoking cessation. Chew once piece slowly and intermittently for 30 minutes.  Repeat every 1-2 hours maximum of 24 pieces a day.      nicotine polacrilex (Nicorette) 4 mg gum Chew 1 each (4 mg) if needed for smoking cessation. 100 each 0    nitroglycerin (Nitrostat) 0.4 mg SL  tablet Place 1 tablet (0.4 mg) under the tongue every 5 minutes if needed for chest pain. May repeat every 5 minutes for up to 3 doses. 100 tablet 11    spironolactone (Aldactone) 25 mg tablet Take 1 tablet (25 mg) by mouth once daily. 90 tablet 3    zoster vaccine-recombinant adjuvanted (Shingrix, PF,) 50 mcg/0.5 mL vaccine Inject into the muscle.      [DISCONTINUED] metoprolol succinate XL (Toprol-XL) 25 mg 24 hr tablet Take 1 tablet (25 mg) by mouth once daily. 90 tablet 2     Current Facility-Administered Medications on File Prior to Visit   Medication Dose Route Frequency Provider Last Rate Last Admin    cefTRIAXone (Rocephin) vial 1 g  1 g intramuscular Once Tevin Ferris MD           Objective   Physicial Exam  General: Well developed, well nourished, alert and cooperative, appears in no acute distress  Eyes: Non-injected conjunctiva, sclera clear, no proptosis  Cardiac: Extremities are warm and well perfused. No edema, cyanosis or pallor.   Lungs: Breathing is easy, non-labored. Speaking in clear and complete sentences. Normal diaphragmatic movement.  MSK: Ambulatory with steady gait, unassisted  Neuro: alert and oriented to person, place and time  Psych: Demonstrates good judgement and reason, without hallucinations, abnormal affect or abnormal behaviors.  Skin: no obvious lesions, no rashes.    Procedure Visit on 06/26/2025   Component Date Value Ref Range Status    POC Color, Urine 06/26/2025 Sarah (A)  Straw, Yellow, Light-Yellow Final    POC Appearance, Urine 06/26/2025 Clear  Clear Final    POC Glucose, Urine 06/26/2025 500 (3+) (A)  NEGATIVE mg/dl Final    POC Bilirubin, Urine 06/26/2025 NEGATIVE  NEGATIVE Final    POC Ketones, Urine 06/26/2025 NEGATIVE  NEGATIVE mg/dl Final    POC Specific Gravity, Urine 06/26/2025 1.025  1.005 - 1.035 Final    POC Blood, Urine 06/26/2025 NEGATIVE  NEGATIVE Final    POC PH, Urine 06/26/2025 6.0  No Reference Range Established PH Final    POC Protein, Urine 06/26/2025  TRACE (A)  NEGATIVE mg/dl Final    POC Urobilinogen, Urine 06/26/2025 0.2  0.2, 1.0 EU/DL Final    Poc Nitrite, Urine 06/26/2025 NEGATIVE  NEGATIVE Final    POC Leukocytes, Urine 06/26/2025 SMALL (1+) (A)  NEGATIVE Final      Review of Systems  All other systems have been reviewed and are negative for complaint.      Assessment and Plan   (Elevated PSA)  Today we discussed PSA as a tool to screen gentleman for prostate cancer. We discussed that many factors can elevate the PSA, and when the PSA is elevated further testing is warranted. I explained that while PSA is used for  prostate cancer screening, it is not specific to prostate cancer and can be elevated with benign growth of the prostate. We discussed proceeding with prostate biopsy, or continuing to monitor his PSA.      Prostate MRI 10/31/2023 demonstrated at 80.2 g prostate, PI-RADS 2 lesion with no evidence of clinically significant malignancy     I personally reviewed prostate MRI from 4/42025  1.  Left mid anterior ill-defined area in the anterior transition  zone consistent with a PI-RADS 3 lesion.  2. Hypertrophy of the transition zone consistent benign prostatic  hyperplasia.    5/2024 PSA 9.89 ng/mL   2/10/2025 PSA 13.60 ng/mL     We discussed MRI findings in great detail. I discussed with the patient that the prostate MRI has a high sensitivity for high-grade prostate cancer lesions but a lower sensitivity for low to intermediate prostate cancer lesions. Given MRI findings, paired with elevated PSA, would recommend proceeding with prostate biopsy, explained. Risks, benefits and alternatives discussed. We discussed prostate biopsies are performed in-office with local anesthesia. You can drive yourself to and from the appointment. Please eat a light meal prior. You will need to use an over-the-counter enema the night prior. If you are taking blood thinners, you will need to temporarily stop prior to prostate biopsy. Will proceed with scheduling  prostate biopsy,     Patient scheduled for Prostate biopsy with Dr. Ferris 7/31    All questions and concerns were addressed. Patient verbalizes understanding and has no other questions at this time.     Vicky Joyner-- KAREEN MARIE  Office Phone:  [phone]

## 2025-07-23 ENCOUNTER — APPOINTMENT (OUTPATIENT)
Dept: UROLOGY | Facility: CLINIC | Age: 70
End: 2025-07-23
Payer: COMMERCIAL

## 2025-07-23 VITALS — TEMPERATURE: 96.7 F

## 2025-07-23 DIAGNOSIS — N40.1 BPH WITH OBSTRUCTION/LOWER URINARY TRACT SYMPTOMS: ICD-10-CM

## 2025-07-23 DIAGNOSIS — R97.20 ELEVATED PSA: Primary | ICD-10-CM

## 2025-07-23 DIAGNOSIS — N13.8 BPH WITH OBSTRUCTION/LOWER URINARY TRACT SYMPTOMS: ICD-10-CM

## 2025-07-23 LAB
POC APPEARANCE, URINE: CLEAR
POC BILIRUBIN, URINE: NEGATIVE
POC BLOOD, URINE: NEGATIVE
POC COLOR, URINE: YELLOW
POC GLUCOSE, URINE: ABNORMAL MG/DL
POC KETONES, URINE: NEGATIVE MG/DL
POC LEUKOCYTES, URINE: NEGATIVE
POC NITRITE,URINE: NEGATIVE
POC PH, URINE: 6 PH
POC PROTEIN, URINE: NEGATIVE MG/DL
POC SPECIFIC GRAVITY, URINE: 1.01
POC UROBILINOGEN, URINE: 0.2 EU/DL

## 2025-07-23 PROCEDURE — 1159F MED LIST DOCD IN RCRD: CPT | Performed by: NURSE PRACTITIONER

## 2025-07-23 PROCEDURE — 1126F AMNT PAIN NOTED NONE PRSNT: CPT | Performed by: NURSE PRACTITIONER

## 2025-07-23 PROCEDURE — G2211 COMPLEX E/M VISIT ADD ON: HCPCS | Performed by: NURSE PRACTITIONER

## 2025-07-23 PROCEDURE — 81003 URINALYSIS AUTO W/O SCOPE: CPT | Performed by: NURSE PRACTITIONER

## 2025-07-23 PROCEDURE — 1160F RVW MEDS BY RX/DR IN RCRD: CPT | Performed by: NURSE PRACTITIONER

## 2025-07-23 PROCEDURE — 99213 OFFICE O/P EST LOW 20 MIN: CPT | Performed by: NURSE PRACTITIONER

## 2025-07-23 PROCEDURE — 51798 US URINE CAPACITY MEASURE: CPT | Performed by: NURSE PRACTITIONER

## 2025-07-23 ASSESSMENT — PAIN SCALES - GENERAL: PAINLEVEL_OUTOF10: 0-NO PAIN

## 2025-07-30 RX ORDER — CEFTRIAXONE 1 G/1
1 INJECTION, POWDER, FOR SOLUTION INTRAMUSCULAR; INTRAVENOUS ONCE
Status: COMPLETED | OUTPATIENT
Start: 2025-07-30 | End: 2025-07-31

## 2025-07-30 NOTE — PROGRESS NOTES
FUV    Last Visit: 6/26/25     HISTORY OF PRESENT ILLNESS:   Kamron Grimm is a 70 y.o. male who is being seen today for prostate biopsy.  -History of Elevated PSA.   -Family history of prostate cancer (maternal uncle)   -Negative biopsy 7/18/19 with Dr. Almazan  -Negative MRI 8/12/21. CAD (NSTEMI 3/24/20 treated with cardiac rehab) on Brilinta HTN.   -Chronic renal insufficiency followed by Dr. Walter  -PI-RADS 2 on MRI 11/2/23  -PI-RADS 3 lesion on prostate MRI from 4/4/25  PAST MEDICAL HISTORY:  Medical History[1]    PAST SURGICAL HISTORY:  Surgical History[2]     ALLERGIES:   Allergies[3]     MEDICATIONS:   Current Outpatient Medications   Medication Instructions    amLODIPine (NORVASC) 5 mg, oral, Daily    aspirin 81 mg EC tablet 1 tablet, Daily    atorvastatin (LIPITOR) 80 mg, oral, Daily    cholecalciferol (Vitamin D-3) 25 MCG (1000 UT) tablet 1 tablet, Daily    clopidogrel (PLAVIX) 75 mg, oral, Daily    dapagliflozin propanediol (FARXIGA) 10 mg, oral, Daily    EPINEPHrine (EPIPEN) 0.3 mg, intramuscular, Once as needed    isosorbide mononitrate ER (IMDUR) 60 mg, oral, Daily    losartan (COZAAR) 100 mg, oral, Daily    metoprolol succinate XL (TOPROL-XL) 25 mg, oral, Daily    nicotine polacrilex (NICORETTE) 4 mg, As needed    nicotine polacrilex (NICORETTE) 4 mg, Mouth/Throat, As needed    nitroglycerin (NITROSTAT) 0.4 mg, sublingual, Every 5 min PRN, May repeat every 5 minutes for up to 3 doses.    spironolactone (ALDACTONE) 25 mg, oral, Daily    zoster vaccine-recombinant adjuvanted (Shingrix, PF,) 50 mcg/0.5 mL vaccine Inject into the muscle.        PHYSICAL EXAM:  Blood pressure 127/76, pulse 55.  Constitutional: Patient appears well-developed and well-nourished. No distress.    Pulmonary/Chest: Effort normal. No respiratory distress.   Abdominal: Soft, ND NT  : WNL  Musculoskeletal: Normal range of motion.    Neurological: Alert and oriented to person, place, and time.  Psychiatric: Normal mood and  affect. Behavior is normal. Thought content normal.      Labs:  Lab Results   Component Value Date    PSA 13.60 (H) 02/10/2025    PSA 9.89 (H) 05/20/2024    PSA 6.23 (H) 09/27/2023    PSA 8.27 (H) 02/15/2023    PSA 8.39 (H) 06/24/2021     Lab Results   Component Value Date    CREATININE 1.69 (H) 02/10/2025     Imaging:   Prostate MRI (04/04/25)  Left mid anterior ill-defined area in the anterior transition zone consistent with a PI-RADS 3 lesion.  Hypertrophy of the transition zone consistent benign prostatic hyperplasia    Discussion: Fusion Biopsy with TRUS of ROIs with standard biopsy was completed today without complications and he tolerated the procedure well. Transrectal diagnostic ultrasound guidance for needle biopsy of the prostate was used. Specimen obtained from 1 MYRON's, Right Base, Right Mid, Right [place], Left Base, Left Mid and Left [place]. Post-biopsy care instructions were provided to patient. Will follow up in 2 weeks to discuss biopsy results. Prostate volume 97.48 ml's.     Assessment:      1. Elevated PSA  Surgical Pathology Exam    Surgical Pathology Exam      2. Prophylactic antibiotic  cefTRIAXone (Rocephin) vial 1 g      3. Lower urinary tract symptoms (LUTS)  POCT UA Automated manually resulted        Plan:   Follow up in 2 weeks for pathology results.     All questions and concerns were addressed. Patient verbalizes understanding and has no other questions at this time.     Scribe Attestation:  Scribed for Dr. Tevin Ferris by Dick [email or web address].   By signing my name below, I, Chantel Corona attest that this documentation has been prepared under the direction and in the presence of Dr. Tevin Ferris MD. All medical record entries made by the Scribe were at my direction or personally dictated by me. I have reviewed the chart and agree that the record accurately reflects my personal performance of the history, physical exam, discussion and plan. 07/31/25         [1]   Past Medical History:  Diagnosis  Date    Encounter for general adult medical examination without abnormal findings 06/24/2022    Physical exam, annual    Encounter for general adult medical examination without abnormal findings 06/24/2021    Physical exam, annual    Encounter for screening for malignant neoplasm of colon 09/24/2020    Colon cancer screening    Other symptoms and signs involving the nervous system 11/08/2019    Suspected sleep apnea    Personal history of other endocrine, nutritional and metabolic disease 05/08/2019    History of hypokalemia   [2]   Past Surgical History:  Procedure Laterality Date    OTHER SURGICAL HISTORY  04/03/2019    Inguinal hernia repair laparoscopic    OTHER SURGICAL HISTORY  04/23/2019    Hernia repair   [3]   Allergies  Allergen Reactions    Bee Venom Protein (Honey Bee) Swelling    Lisinopril Cough and Unknown    Prednisone Nausea/vomiting

## 2025-07-31 ENCOUNTER — PROCEDURE VISIT (OUTPATIENT)
Dept: UROLOGY | Facility: HOSPITAL | Age: 70
End: 2025-07-31
Payer: COMMERCIAL

## 2025-07-31 VITALS — HEART RATE: 55 BPM | DIASTOLIC BLOOD PRESSURE: 76 MMHG | SYSTOLIC BLOOD PRESSURE: 127 MMHG

## 2025-07-31 DIAGNOSIS — R39.9 LOWER URINARY TRACT SYMPTOMS (LUTS): ICD-10-CM

## 2025-07-31 DIAGNOSIS — R97.20 ELEVATED PSA: ICD-10-CM

## 2025-07-31 DIAGNOSIS — Z79.2 PROPHYLACTIC ANTIBIOTIC: ICD-10-CM

## 2025-07-31 PROCEDURE — 55700 HC BIOPSY OF PROSTATE,NEEDLE/PUNCH: CPT | Performed by: UROLOGY

## 2025-07-31 PROCEDURE — 76942 ECHO GUIDE FOR BIOPSY: CPT | Performed by: UROLOGY

## 2025-07-31 PROCEDURE — 81003 URINALYSIS AUTO W/O SCOPE: CPT | Mod: QW | Performed by: UROLOGY

## 2025-07-31 PROCEDURE — 2500000004 HC RX 250 GENERAL PHARMACY W/ HCPCS (ALT 636 FOR OP/ED): Performed by: UROLOGY

## 2025-07-31 PROCEDURE — 55700 PR PROSTATE NEEDLE BIOPSY ANY APPROACH: CPT | Performed by: UROLOGY

## 2025-07-31 PROCEDURE — 76872 US TRANSRECTAL: CPT | Performed by: UROLOGY

## 2025-07-31 PROCEDURE — 96372 THER/PROPH/DIAG INJ SC/IM: CPT | Performed by: UROLOGY

## 2025-07-31 RX ADMIN — CEFTRIAXONE 1 G: 1 INJECTION, POWDER, FOR SOLUTION INTRAMUSCULAR; INTRAVENOUS at 11:20

## 2025-08-06 LAB
LABORATORY COMMENT REPORT: NORMAL
PATH REPORT.FINAL DX SPEC: NORMAL
PATH REPORT.GROSS SPEC: NORMAL
PATH REPORT.RELEVANT HX SPEC: NORMAL
PATH REPORT.TOTAL CANCER: NORMAL

## 2025-08-11 DIAGNOSIS — N18.32 STAGE 3B CHRONIC KIDNEY DISEASE (MULTI): ICD-10-CM

## 2025-08-14 ENCOUNTER — OFFICE VISIT (OUTPATIENT)
Dept: UROLOGY | Facility: HOSPITAL | Age: 70
End: 2025-08-14
Payer: COMMERCIAL

## 2025-08-14 DIAGNOSIS — R97.20 ELEVATED PSA: Primary | ICD-10-CM

## 2025-08-14 PROCEDURE — 1159F MED LIST DOCD IN RCRD: CPT | Performed by: UROLOGY

## 2025-08-14 PROCEDURE — G2211 COMPLEX E/M VISIT ADD ON: HCPCS | Performed by: UROLOGY

## 2025-08-14 PROCEDURE — 99214 OFFICE O/P EST MOD 30 MIN: CPT | Performed by: UROLOGY

## 2025-08-14 PROCEDURE — 99212 OFFICE O/P EST SF 10 MIN: CPT | Performed by: UROLOGY

## 2025-08-21 ENCOUNTER — APPOINTMENT (OUTPATIENT)
Dept: OPHTHALMOLOGY | Facility: CLINIC | Age: 70
End: 2025-08-21
Payer: COMMERCIAL

## 2025-08-25 DIAGNOSIS — I10 BENIGN ESSENTIAL HYPERTENSION: ICD-10-CM

## 2025-08-25 RX ORDER — ISOSORBIDE MONONITRATE 60 MG/1
60 TABLET, EXTENDED RELEASE ORAL DAILY
Qty: 90 TABLET | Refills: 0 | Status: SHIPPED | OUTPATIENT
Start: 2025-08-25

## 2025-08-25 RX ORDER — ISOSORBIDE MONONITRATE 60 MG/1
60 TABLET, EXTENDED RELEASE ORAL DAILY
Qty: 90 TABLET | Refills: 0 | Status: SHIPPED | OUTPATIENT
Start: 2025-08-25 | End: 2025-08-25 | Stop reason: SDUPTHER

## 2025-09-16 ENCOUNTER — APPOINTMENT (OUTPATIENT)
Dept: NEPHROLOGY | Facility: CLINIC | Age: 70
End: 2025-09-16
Payer: COMMERCIAL

## 2025-12-02 ENCOUNTER — APPOINTMENT (OUTPATIENT)
Dept: PRIMARY CARE | Facility: CLINIC | Age: 70
End: 2025-12-02
Payer: COMMERCIAL

## 2026-01-23 ENCOUNTER — APPOINTMENT (OUTPATIENT)
Dept: UROLOGY | Facility: CLINIC | Age: 71
End: 2026-01-23
Payer: COMMERCIAL